# Patient Record
Sex: MALE | Race: BLACK OR AFRICAN AMERICAN | Employment: OTHER | URBAN - METROPOLITAN AREA
[De-identification: names, ages, dates, MRNs, and addresses within clinical notes are randomized per-mention and may not be internally consistent; named-entity substitution may affect disease eponyms.]

---

## 2018-04-23 ENCOUNTER — HOSPITAL ENCOUNTER (EMERGENCY)
Age: 23
Discharge: HOME | End: 2018-04-23
Attending: EMERGENCY MEDICINE
Payer: SELF-PAY

## 2018-04-23 ENCOUNTER — APPOINTMENT (OUTPATIENT)
Dept: GENERAL RADIOLOGY | Age: 23
End: 2018-04-23
Attending: PHYSICIAN ASSISTANT
Payer: SELF-PAY

## 2018-04-23 VITALS
HEIGHT: 66 IN | RESPIRATION RATE: 16 BRPM | WEIGHT: 128.31 LBS | SYSTOLIC BLOOD PRESSURE: 122 MMHG | TEMPERATURE: 97.9 F | HEART RATE: 91 BPM | BODY MASS INDEX: 20.62 KG/M2 | DIASTOLIC BLOOD PRESSURE: 89 MMHG | OXYGEN SATURATION: 100 %

## 2018-04-23 DIAGNOSIS — S61.411A LACERATION OF RIGHT HAND WITHOUT FOREIGN BODY, INITIAL ENCOUNTER: Primary | ICD-10-CM

## 2018-04-23 DIAGNOSIS — Z23 NEED FOR TETANUS BOOSTER: ICD-10-CM

## 2018-04-23 DIAGNOSIS — Z71.6 TOBACCO ABUSE COUNSELING: ICD-10-CM

## 2018-04-23 PROCEDURE — 74011250636 HC RX REV CODE- 250/636: Performed by: PHYSICIAN ASSISTANT

## 2018-04-23 PROCEDURE — 90715 TDAP VACCINE 7 YRS/> IM: CPT | Performed by: PHYSICIAN ASSISTANT

## 2018-04-23 PROCEDURE — 99283 EMERGENCY DEPT VISIT LOW MDM: CPT

## 2018-04-23 PROCEDURE — 73130 X-RAY EXAM OF HAND: CPT

## 2018-04-23 PROCEDURE — 90471 IMMUNIZATION ADMIN: CPT

## 2018-04-23 RX ADMIN — TETANUS TOXOID, REDUCED DIPHTHERIA TOXOID AND ACELLULAR PERTUSSIS VACCINE, ADSORBED 0.5 ML: 5; 2.5; 8; 8; 2.5 SUSPENSION INTRAMUSCULAR at 11:52

## 2018-04-23 NOTE — ED PROVIDER NOTES
EMERGENCY DEPARTMENT HISTORY AND PHYSICAL EXAM      Date: 4/23/2018  Patient Name: Holland Dyer    History of Presenting Illness     Chief Complaint   Patient presents with    Laceration     right hand 3rd, 4th and 5th fingers, x 2 days ago cut them moving stuff in kam.  g       History Provided By: Patient    HPI: Holland Dyer, 25 y.o. male with PMHx significant for tobacco abuse, presents ambulatory to the ED with cc of hand laceration that he obtained 3 days ago. Pt states he was moving furniture, when his hand got caught between furniture and a glass door which broke. He did not come immediately to be seen because he thought the cut would get better. He is not UTD with his tetanus vaccine. He is right-handed. Pt reports mild tenderness to the area that he cut. He states that he has been cleaning the area twice daily and applying antibiotic ointment. He denies purulent drainage, erythema, warmth, fevers. PCP: Deion Watts MD    There are no other complaints, changes, or physical findings at this time. Past History     Past Medical History:  History reviewed. No pertinent past medical history. Past Surgical History:  History reviewed. No pertinent surgical history. Family History:  History reviewed. No pertinent family history. Social History:  Social History   Substance Use Topics    Smoking status: Current Every Day Smoker    Smokeless tobacco: None    Alcohol use Yes      Comment: sometimes       Allergies:  No Known Allergies      Review of Systems   Review of Systems   Constitutional: Negative. Negative for activity change, appetite change, chills, diaphoresis, fever and unexpected weight change. HENT: Negative for congestion, hearing loss, rhinorrhea, sinus pressure, sneezing, sore throat and trouble swallowing. Eyes: Negative for pain, redness, itching and visual disturbance. Respiratory: Negative for cough, shortness of breath and wheezing.     Cardiovascular: Negative for chest pain, palpitations and leg swelling. Gastrointestinal: Negative for abdominal pain, constipation, diarrhea, nausea and vomiting. Genitourinary: Negative for dysuria. Musculoskeletal: Negative for arthralgias, gait problem and myalgias. Skin: Positive for wound. Negative for color change, pallor and rash. Neurological: Negative for tremors, weakness, light-headedness, numbness and headaches. All other systems reviewed and are negative. Physical Exam   Physical Exam   Constitutional: He is oriented to person, place, and time. He appears well-developed and well-nourished. No distress. 25 y.o.  male in NAD  Communicates appropriately and in full sentences   HENT:   Head: Normocephalic and atraumatic. Eyes: Conjunctivae are normal. Pupils are equal, round, and reactive to light. Right eye exhibits no discharge. Left eye exhibits no discharge. Neck: Normal range of motion. Neck supple. No nuchal rigidity or meningeal signs   Cardiovascular:   Pulses:       Radial pulses are 2+ on the right side, and 2+ on the left side. Pulmonary/Chest: Effort normal. No respiratory distress. Musculoskeletal: Normal range of motion. He exhibits tenderness (over R 5th finger). He exhibits no edema or deformity. No neurologic, motor, vascular, or compartment embarrassment observed on exam. No focal neurologic deficits. Neurological: He is alert and oriented to person, place, and time. Skin: Skin is warm and dry. No rash noted. He is not diaphoretic. No erythema. No pallor. 1 cm x 0.5 cm abrasion at the base of the 5th finger with no active bleeding. Appropriate healing visualized with good granulation tissue. NO purulent drainage, erythema, streaking. Area is minimally tender to palpation. Small scattered superficial abrasions to 2, 3, 4th fingers with no active bleeding, that are non-TTP   Psychiatric: He has a normal mood and affect.  His behavior is normal.   Nursing note and vitals reviewed. Diagnostic Study Results     Labs -   No results found for this or any previous visit (from the past 12 hour(s)). Radiologic Studies -   XR HAND RT MIN 3 V   Final Result        CT Results  (Last 48 hours)    None        CXR Results  (Last 48 hours)    None            Medical Decision Making   I am the first provider for this patient. I reviewed the vital signs, available nursing notes, past medical history, past surgical history, family history and social history. Vital Signs-Reviewed the patient's vital signs. Patient Vitals for the past 12 hrs:   Temp Pulse Resp BP SpO2   04/23/18 1107 97.9 °F (36.6 °C) 91 16 122/89 100 %     Records Reviewed: Nursing Notes and Old Medical Records    Provider Notes (Medical Decision Making):   DDX: wound care, wound repair, tetanus update, retained FB    XR without acute fracture or FB. Discussed wound care with patient. No clinical indication for ABX at this point in time. Wound care completed by nursing staff and wrapped with sterile dressing. Urged pt to return should he experienced fevers, color change, purulent drainage, streaking. Pt expresses understanding. Provided customary ED return precautions. ED Course:   Initial assessment performed. The patients presenting problems have been discussed, and they are in agreement with the care plan formulated and outlined with them. I have encouraged them to ask questions as they arise throughout their visit. TOBACCO COUNSELING:  Upon evaluation, pt expressed that they are a current tobacco user. Pt has been counseled on the dangers of smoking and was encouraged to quit as soon as possible in order to decrease further risks to their health. Pt has conveyed their understanding of the risks involved should they continue to use tobacco products. DISCHARGE NOTE:  Kati Олег  results have been reviewed with him. He has been counseled regarding his diagnosis.   He verbally conveys understanding and agreement of the signs, symptoms, diagnosis, treatment and prognosis and additionally agrees to follow up as recommended with Dr. Trisha Johnson MD in 24 - 48 hours. He also agrees with the care-plan and conveys that all of his questions have been answered. I have also put together some discharge instructions for him that include: 1) educational information regarding their diagnosis, 2) how to care for their diagnosis at home, as well a 3) list of reasons why they would want to return to the ED prior to their follow-up appointment, should their condition change. He and/or family's questions have been answered. I have encouraged them to see the official results in [facility]\" or to retrieve the specifics of their results from medical records. PLAN:  1. Return precautions as discussed  2. Follow-up with providers as directed  3. Medications as prescribed    Return to ED if worse     Diagnosis     Clinical Impression:   1. Laceration of right hand without foreign body, initial encounter    2. Need for tetanus booster    3. Tobacco abuse counseling        There are no discharge medications for this patient. Follow-up Information     Follow up With Details Comments Contact Info    Trisha Johnson MD Schedule an appointment as soon as possible for a visit in 2 days As needed, If symptoms worsen, Possible further evaluation and treatment [address] [facility] [address]      [facility] EMERGENCY DEPT Go to As needed, If symptoms worsen [address] [facility]    Pawel Arroyo MD Call today  [address] [facility]  [address]  [facility].  [phone]                This note will not be viewable in [address]

## 2018-04-23 NOTE — LETTER
[facility] EMERGENCY DEPT 
[address] [facility] [address] [id number] [phone] Work/School Note Date: 4/23/2018 To Whom It May concern: 
 
Asim Wray was seen and treated today in the emergency room by the following provider(s): 
Attending Provider: Martinez Vaughn MD 
Physician Assistant: Jammie Senior. Alisa Spencer. Asim Wray may return to work on 4/25/2018. Sincerely, 
 
 
 
 
Jammie Senior.  Alisa Spencer

## 2018-04-23 NOTE — DISCHARGE INSTRUCTIONS
Cuts Left Open: Care Instructions  Your Care Instructions    A cut can happen anywhere on your body. Sometimes a cut can injure the tendons, blood vessels, or nerves. A cut may be left open instead of being closed with stitches, staples, or adhesive. A cut may be left open when it is likely to become infected, because closing it can make infection even more likely. You will probably have a bandage. The doctor may want the cut to stay open the whole time it heals. This happens with some cuts when too much time has gone by since the cut happened. Or the doctor may tell you to come back to have the cut closed in 4 to 5 days, when there is less chance of infection. If the cut stays open while healing, your scar may be larger than if the cut was closed. But you can get treatment later to make the scar smaller. The doctor has checked you carefully, but problems can develop later. If you notice any problems or new symptoms, get medical treatment right away. Follow-up care is a key part of your treatment and safety. Be sure to make and go to all appointments, and call your doctor if you are having problems. It's also a good idea to know your test results and keep a list of the medicines you take. How can you care for yourself at home? · Keep the cut dry for the first 24 to 48 hours. After this, you can shower if your doctor okays it. Pat the cut dry. · Don't soak the cut, such as in a bathtub. Your doctor will tell you when it's safe to get the cut wet. · If your doctor told you how to care for your cut, follow your doctor's instructions. If you did not get instructions, follow this general advice:  ¨ After the first 24 to 48 hours, wash the cut with clean water 2 times a day. Don't use hydrogen peroxide or alcohol, which can slow healing. ¨ You may cover the cut with a thin layer of petroleum jelly, such as Vaseline, and a nonstick bandage.   ¨ Apply more petroleum jelly and replace the bandage as needed. · Prop up the injured area on a pillow anytime you sit or lie down during the next 3 days. Try to keep it above the level of your heart. This will help reduce swelling. · Avoid any activity that could cause your cut to get worse. · Take pain medicines exactly as directed. ¨ If the doctor gave you a prescription medicine for pain, take it as prescribed. ¨ If you are not taking a prescription pain medicine, ask your doctor if you can take an over-the-counter medicine. When should you call for help? Call your doctor now or seek immediate medical care if:  ? · You have new pain, or your pain gets worse. ? · The cut starts to bleed, and blood soaks through the bandage. Oozing small amounts of blood is normal.   ? · The skin near the cut is cold or pale or changes color. ? · You have tingling, weakness, or numbness near the cut.   ? · You have trouble moving the area near the cut.   ? · You have symptoms of infection, such as:  ¨ Increased pain, swelling, warmth, or redness around the cut. ¨ Red streaks leading from the cut. ¨ Pus draining from the cut. ¨ A fever. ? Watch closely for changes in your health, and be sure to contact your doctor if:  ? · The cut is not closing (getting smaller). ? · You do not get better as expected. Where can you learn more? Go to http://deanna-whit.info/. Enter 20-23-41-52 in the search box to learn more about \"Cuts Left Open: Care Instructions. \"  Current as of: March 20, 2017  Content Version: 11.4  © 8115-0699 [employer]. Care instructions adapted under license by [employer] (which disclaims liability or warranty for this information). If you have questions about a medical condition or this instruction, always ask your healthcare professional. [place] [address] any warranty or liability for your use of this information.        Hand Pain: Care Instructions  Your Care Instructions    Common causes of hand pain are overuse and injuries, such as might happen during sports or home repair projects. Everyday wear and tear, especially as you get older, also can cause hand pain. Most minor hand injuries will heal on their own, and home treatment is usually all you need to do. If you have sudden and severe pain, you may need tests and treatment. Follow-up care is a key part of your treatment and safety. Be sure to make and go to all appointments, and call your doctor if you are having problems. It's also a good idea to know your test results and keep a list of the medicines you take. How can you care for yourself at home? · Take pain medicines exactly as directed. ¨ If the doctor gave you a prescription medicine for pain, take it as prescribed. ¨ If you are not taking a prescription pain medicine, ask your doctor if you can take an over-the-counter medicine. · Rest and protect your hand. Take a break from any activity that may cause pain. · Put ice or a cold pack on your hand for 10 to 20 minutes at a time. Put a thin cloth between the ice and your skin. · Prop up the sore hand on a pillow when you ice it or anytime you sit or lie down during the next 3 days. Try to keep it above the level of your heart. This will help reduce swelling. · If your doctor recommends a sling, splint, or elastic bandage to support your hand, wear it as directed. When should you call for help? Call 911 anytime you think you may need emergency care. For example, call if:  ? · Your hand turns cool or pale or changes color. ?Call your doctor now or seek immediate medical care if:  ? · You cannot move your hand. ? · Your hand pops, moves out of its normal position, and then returns to its normal position. ? · You have signs of infection, such as:  ¨ Increased pain, swelling, warmth, or redness. ¨ Red streaks leading from the sore area. ¨ Pus draining from a place on your hand. ¨ A fever. ? · Your hand feels numb or tingly. ?Watch closely for changes in your health, and be sure to contact your doctor if:  ? · Your hand feels unstable when you try to use it. ? · You do not get better as expected. ? · You have any new symptoms, such as swelling. ? · Bruises from an injury to your hand last longer than 2 weeks. Where can you learn more? Go to http://deanna-whit.info/. Enter R273 in the search box to learn more about \"Hand Pain: Care Instructions. \"  Current as of: March 20, 2017  Content Version: 11.4  © 9403-5758 [employer]. Care instructions adapted under license by [employer] (which disclaims liability or warranty for this information). If you have questions about a medical condition or this instruction, always ask your healthcare professional. [address] any warranty or liability for your use of this information. Bones of the Hand: Anatomy Sketch    Current as of: March 21, 2017  Content Version: 11.4  © 1876-3341 [employer]. Care instructions adapted under license by [employer] (which disclaims liability or warranty for this information). If you have questions about a medical condition or this instruction, always ask your healthcare professional. [address] any warranty or liability for your use of this information.

## 2019-10-01 ENCOUNTER — HOSPITAL ENCOUNTER (EMERGENCY)
Age: 24
Discharge: HOME | End: 2019-10-01
Attending: EMERGENCY MEDICINE
Payer: MEDICAID

## 2019-10-01 VITALS
SYSTOLIC BLOOD PRESSURE: 127 MMHG | HEIGHT: 65 IN | RESPIRATION RATE: 14 BRPM | WEIGHT: 130 LBS | DIASTOLIC BLOOD PRESSURE: 90 MMHG | OXYGEN SATURATION: 100 % | HEART RATE: 46 BPM | BODY MASS INDEX: 21.66 KG/M2 | TEMPERATURE: 97.6 F

## 2019-10-01 DIAGNOSIS — Z20.2 EXPOSURE TO GONORRHEA: ICD-10-CM

## 2019-10-01 DIAGNOSIS — G89.29 ACUTE EXACERBATION OF CHRONIC LOW BACK PAIN: ICD-10-CM

## 2019-10-01 DIAGNOSIS — Z20.2 EXPOSURE TO SEXUALLY TRANSMITTED DISEASE (STD): Primary | ICD-10-CM

## 2019-10-01 DIAGNOSIS — Z72.0 TOBACCO ABUSE: ICD-10-CM

## 2019-10-01 DIAGNOSIS — Z76.89 ENCOUNTER FOR ASSESSMENT OF STD EXPOSURE: ICD-10-CM

## 2019-10-01 DIAGNOSIS — M54.50 ACUTE EXACERBATION OF CHRONIC LOW BACK PAIN: ICD-10-CM

## 2019-10-01 LAB
APPEARANCE UR: CLEAR
BACTERIA URNS QL MICRO: NEGATIVE /HPF
BILIRUB UR QL: NEGATIVE
COLOR UR: ABNORMAL
EPITH CASTS URNS QL MICRO: ABNORMAL /LPF
GLUCOSE UR STRIP.AUTO-MCNC: NEGATIVE MG/DL
HGB UR QL STRIP: NEGATIVE
KETONES UR QL STRIP.AUTO: NEGATIVE MG/DL
LEUKOCYTE ESTERASE UR QL STRIP.AUTO: ABNORMAL
NITRITE UR QL STRIP.AUTO: NEGATIVE
PH UR STRIP: 7 [PH] (ref 5–8)
PROT UR STRIP-MCNC: NEGATIVE MG/DL
RBC #/AREA URNS HPF: ABNORMAL /HPF (ref 0–5)
SP GR UR REFRACTOMETRY: 1.02 (ref 1–1.03)
UA: UC IF INDICATED,UAUC: ABNORMAL
UROBILINOGEN UR QL STRIP.AUTO: 1 EU/DL (ref 0.2–1)
WBC URNS QL MICRO: ABNORMAL /HPF (ref 0–4)

## 2019-10-01 PROCEDURE — 81001 URINALYSIS AUTO W/SCOPE: CPT

## 2019-10-01 PROCEDURE — 96372 THER/PROPH/DIAG INJ SC/IM: CPT

## 2019-10-01 PROCEDURE — 74011250636 HC RX REV CODE- 250/636: Performed by: EMERGENCY MEDICINE

## 2019-10-01 PROCEDURE — 74011000250 HC RX REV CODE- 250: Performed by: EMERGENCY MEDICINE

## 2019-10-01 PROCEDURE — 87086 URINE CULTURE/COLONY COUNT: CPT

## 2019-10-01 PROCEDURE — 74011250637 HC RX REV CODE- 250/637: Performed by: EMERGENCY MEDICINE

## 2019-10-01 PROCEDURE — 99283 EMERGENCY DEPT VISIT LOW MDM: CPT

## 2019-10-01 PROCEDURE — 87491 CHLMYD TRACH DNA AMP PROBE: CPT

## 2019-10-01 RX ORDER — AZITHROMYCIN 500 MG/1
1000 TABLET, FILM COATED ORAL
Status: COMPLETED | OUTPATIENT
Start: 2019-10-01 | End: 2019-10-01

## 2019-10-01 RX ADMIN — AZITHROMYCIN MONOHYDRATE 1000 MG: 500 TABLET ORAL at 05:24

## 2019-10-01 RX ADMIN — LIDOCAINE HYDROCHLORIDE 250 MG: 10 INJECTION, SOLUTION EPIDURAL; INFILTRATION; INTRACAUDAL; PERINEURAL at 05:24

## 2019-10-01 NOTE — ED TRIAGE NOTES
Pt presents to the ED with c/o lower back pain x 1 month. Pt reports taking ibuprofen and hot baths without relief. Pt reports playing football but denies injury. Pt also has concerns for STD's. Pt stated he has dysuria but denies discharge or frequency. Denies lesions or bumps. Pt is alert, oriented and appropriate. Ambulatory on arrival.       Emergency Department Nursing Plan of Care       The Nursing Plan of Care is developed from the Nursing assessment and Emergency Department Attending provider initial evaluation. The plan of care may be reviewed in the ED Provider note.     The Plan of Care was developed with the following considerations:   Patient / Family readiness to learn indicated by:verbalized understanding  Persons(s) to be included in education: patient  Barriers to Learning/Limitations:No    Signed     Que Villasenor    10/1/2019   5:07 AM

## 2019-10-01 NOTE — LETTER
10/2/2019 Yasmin Torrez [address] [id number] Dear Mr. Pasha Teague You were seen in the Emergency Department of [address] [place] [address] on 10/1/2019 and had lab and/or radiology tests performed. The gonorrhea, chlamydia from your Emergency Department visit on 10/1/2019 was positive. You were treated appropriately during your visit. No further treatment is required. If you have not improved or are worsening, please follow up with your primary care doctor or Emergency department as soon as possible. Your partner needs to be treated. If you have any questions please contact the Emergency Department at [phone]. Sincerely, Marika Barriga PA-C 
 
[facility] EMERGENCY DEPT 
[address] [place] [id number] [phone]

## 2019-10-01 NOTE — ED PROVIDER NOTES
EMERGENCY DEPARTMENT HISTORY AND PHYSICAL EXAM      Please note that this dictation was completed with [employer], the computer voice recognition software. Quite often unanticipated grammatical, syntax, homophones, and other interpretive errors are inadvertently transcribed by the computer software. Please disregard these errors. Please excuse any errors that have escaped final proofreading. Date: 10/1/2019  Patient Name: Yvon Salamanca  Patient Age and Sex: 25 y.o. male    History of Presenting Illness     Chief Complaint   Patient presents with    Back Pain    Exposure to STD       History Provided By: Patient    HPI: Yvon Salamanca, 25 y.o. male with past medical history as documented below presents to the ED with c/o of one month history of atraumatic low back pain as well as concerns for STD exposure. Pt reports he plays football but denies any known injuries. He has tried Motrin and hot baths without relief. He denies any numbness, weakness or loss of bladder or bowel control. Additionally, he reports concerns for STD exposure given recent unprotected sex. He wants empiric treatment and testing. Pt denies any other alleviating or exacerbating factors. Additionally, pt specifically denies any recent fever, chills, headache, nausea, vomiting, abdominal pain, CP, SOB, lightheadedness, dizziness, numbness, weakness, BLE swelling, heart palpitations, urinary sxs, diarrhea, constipation, melena, hematochezia, cough, or congestion. There are no other complaints, changes or physical findings at this time. PCP: Haydee Shipley MD    Past History   Past Medical History:  History reviewed. No pertinent past medical history. Past Surgical History:  History reviewed. No pertinent surgical history. Family History:  History reviewed. No pertinent family history.     Social History:  Social History     Tobacco Use    Smoking status: Current Every Day Smoker   Substance Use Topics    Alcohol use: Yes     Comment: sometimes    Drug use: No       Allergies:  No Known Allergies    Current Medications:  No current facility-administered medications on file prior to encounter. No current outpatient medications on file prior to encounter. Review of Systems   Review of Systems   Constitutional: Negative. Negative for chills and fever. HENT: Negative. Negative for congestion, facial swelling, rhinorrhea, sore throat, trouble swallowing and voice change. Eyes: Negative. Respiratory: Negative. Negative for apnea, cough, chest tightness, shortness of breath and wheezing. Cardiovascular: Negative. Negative for chest pain, palpitations and leg swelling. Gastrointestinal: Negative. Negative for abdominal distention, abdominal pain, blood in stool, constipation, diarrhea, nausea and vomiting. Endocrine: Negative. Negative for cold intolerance, heat intolerance and polyuria. Genitourinary: Negative. Negative for difficulty urinating, dysuria, flank pain, frequency, hematuria and urgency. Musculoskeletal: Positive for back pain and myalgias. Negative for arthralgias, neck pain and neck stiffness. Skin: Negative. Negative for color change and rash. Neurological: Negative. Negative for dizziness, syncope, facial asymmetry, speech difficulty, weakness, light-headedness, numbness and headaches. Hematological: Negative. Does not bruise/bleed easily. Psychiatric/Behavioral: Negative. Negative for confusion and self-injury. The patient is not nervous/anxious. Physical Exam   Physical Exam   Constitutional: He is oriented to person, place, and time. Vital signs are normal. He appears well-developed and well-nourished. He is cooperative. Non-toxic appearance. HENT:   Head: Normocephalic and atraumatic. Mouth/Throat: Mucous membranes are normal. No posterior oropharyngeal erythema. Eyes: Pupils are equal, round, and reactive to light.  Conjunctivae and EOM are normal.   Neck: Normal range of motion. Cardiovascular: Normal rate, regular rhythm, normal heart sounds and intact distal pulses. Exam reveals no gallop and no friction rub. No murmur heard. Pulmonary/Chest: Effort normal and breath sounds normal. No respiratory distress. He has no wheezes. He has no rales. He exhibits no tenderness. Abdominal: Soft. Bowel sounds are normal. He exhibits no distension and no mass. There is no tenderness. There is no rebound and no guarding. Musculoskeletal: Normal range of motion. He exhibits tenderness (paralumbar TTP, no mildine TTP). He exhibits no edema or deformity. Neurological: He is alert and oriented to person, place, and time. He displays normal reflexes. No cranial nerve deficit. He exhibits normal muscle tone. Coordination normal.   Skin: Skin is warm. No rash noted. Psychiatric: He has a normal mood and affect. Nursing note and vitals reviewed. Diagnostic Study Results     Labs -  No results found for this or any previous visit (from the past 24 hour(s)). Radiologic Studies -   No orders to display     CT Results  (Last 48 hours)    None        CXR Results  (Last 48 hours)    None          Medical Decision Making   I am the first provider for this patient. I reviewed the vital signs, available nursing notes, past medical history, past surgical history, family history and social history. Vital Signs-Reviewed the patient's vital signs. Patient Vitals for the past 24 hrs:   Temp Pulse Resp BP SpO2   10/01/19 0504 97.6 °F (36.4 °C) (!) 46 14 127/90 100 %       Pulse Oximetry Analysis - 100% on RA    Cardiac Monitor:   Rate: 46 bpm  Rhythm: Normal Sinus Rhythm      Records Reviewed: Nursing Notes, Old Medical Records, Previous electrocardiograms, Previous Radiology Studies and Previous Laboratory Studies    Provider Notes (Medical Decision Making): The patient presents with acute low back pain. Stable vitals and benign exam. DDx: strain, sprain, sciatica, MSK pain. Clinical presentation not consistent with  pathology, aortic dissection or AAA. There is no urine/bowel incontinence or perianal numbess to suggest cauda equina. No fever/chills, IVDA to suggest epidural abscess or discitis. No focal weakness or sensory changes to suggest transverse myelitis. Therefore MRI not indicated. No risk of compression fracture (not in right age group or suffer from [address]) or trauma to warrant x-ray. I have recommended rest, avoiding heavy lifting until better, use of intermittent heat (avoid sleeping on a heating pad), and use of OTC NSAID's (Advil, Aleve etc) or Tylenol prn for pain. Call PCP if back pain persists or he develops leg symptoms or other concerning red flags. Will provide pain control and refer to PT. Also, pt presents with STD exposure  DDx: Acute cystitis, pyleonephritis, ureteral stone, STI. Will obtain UA and treat accordingly. If patient expresses concern for STI exposure, will empirically treat. Also, discussed importance of testing partners and safe sex education provided. Discussed with the patient diagnosis and test results and all questioned fully answered. They understand the importance of staying well hydrated, taking antibiotics as prescribed to completion. He will PCP if any problems arise. ED Course:   Initial assessment performed. The patients presenting problems have been discussed, and they are in agreement with the care plan formulated and outlined with them. I have encouraged them to ask questions as they arise throughout their visit. TOBACCO COUNSELING:   Upon evaluation, pt expressed that they are a current tobacco user. For approximately 10 minutes, pt has been counseled on the dangers of smoking and was encouraged to quit as soon as possible in order to decrease further risks to their health. Pt has conveyed their understanding of the risks involved should they continue to use tobacco products.     ALCOHOL/SUBSTANCE ABUSE COUNSELING:  Upon evaluation, pt endorsed recent alcohol/illicit drug use. For approximately 15 minutes, pt has been counseled on the dangers of alcohol and illicit drug use on their health, and they were encouraged to quit as soon as possible in order to decrease further risks to their health. Pt has conveyed their understanding of the risks involved should they continue to use these products. I reviewed our electronic medical record system for any past medical records that were available that may contribute to the patient's current condition, the nursing notes and vital signs from today's visit. Sherri Montague MD    ED Orders Placed :  Orders Placed This Encounter    CHLAMYDIA/GC AMPLIFIED PROBE [[id number]]    URINALYSIS W/ REFLEX CULTURE    ENCOURAGE PO FLUIDS    cefTRIAXone (ROCEPHIN) 250 mg in lidocaine (PF) (XYLOCAINE) 10 mg/mL (1 %) IM injection    azithromycin (ZITHROMAX) tablet 1,000 mg     ED Medications Administered:  Medications   cefTRIAXone (ROCEPHIN) 250 mg in lidocaine (PF) (XYLOCAINE) 10 mg/mL (1 %) IM injection (has no administration in time range)   azithromycin (ZITHROMAX) tablet 1,000 mg (has no administration in time range)        Progress Note:  Patient has been reassessed and reports feeling better and symptoms have improved significantly after ED treatment. Patient feels comfortable going home with close follow-up. Cecilia Reyes final labs and imaging have been reviewed with him and available family and/or caregiver. They have been counseled regarding his diagnosis. He verbally conveys understanding and agreement of the signs, symptoms, diagnosis, treatment and prognosis and additionally agrees to follow up as recommended with Dr. Hina Domingo MD and/or specialist in 24 - 48 hours. He also agrees with the care-plan we created together and conveys that all of his questions have been answered.   I have also put together some discharge instructions for him that include: 1) educational information regarding their diagnosis, 2) how to care for their diagnosis at home, as well a 3) list of reasons why they would want to return to the ED prior to their follow-up appointment should the patient's condition change or symptoms worsen. I have answered all questions to the patient's satisfaction. Strict return precautions given. He both understood and agreed with plan as discussed. Vital signs stable for discharge. Disposition: DISCHARGE  The pt is ready for discharge. The pt's signs, symptoms, diagnosis, and discharge instructions have been discussed and pt has conveyed their understanding. The pt is to follow up as recommended or return to ER should their symptoms worsen. Plan has been discussed and pt is in agreement. PLAN:  1. No current facility-administered medications for this encounter. No current outpatient medications on file. 2.   Follow-up Information     Follow up With Specialties Details Why Contact Info    Sophia Correia MD Internal Medicine   [address]  [facility] [address]  [phone]      [facility] EMERGENCY DEPT Emergency Medicine  As needed, If symptoms worsen [address] [facility] [place]  [phone]          Return to ED if worse  Diagnosis     Clinical Impression:   1. Exposure to sexually transmitted disease (STD)    2. Encounter for assessment of STD exposure    3. Tobacco abuse    4. Exposure to gonorrhea    5. Acute exacerbation of chronic low back pain        Attestation:  I personally performed the services described in this documentation on this date 10/1/2019 for patientRobe. Pawel Ibarra MD      This note will not be viewable in [address]

## 2019-10-01 NOTE — DISCHARGE INSTRUCTIONS
[facility] SYSTEMS Departments   For adult and child immunizations, family planning, TB screening, STD testing and women's health services. [facility]: [place] [phone]     [place] [address] [facility] 1822   [facility]   729 [facility]: Brina Guerrero [address] [phone]     [address]        [place] [place] [address]     For primary care services, woman and child wellness, and some clinics providing specialty care. [facility] -- [address] 2525 [facility] [phone]   411 [facility] 200 [facility] 3614 [facility] [phone]   [address] [facility] [address] [place] [address] [phone]   [address] [facility] 1604 [facility] 5850  Community  [phone]   7704 [facility] [address] [address] [place] [phone]   [facility] 81 [facility] [phone]   [place] [address] [phone]   [facility]: [facility] 4100 [facility] [address] [place] [address], [address]     [place] [address] 3906 [facility]  2930  Community  [phone]   [employer]  [address] ([email or web address]         Sexual Health/Woman Wellness Clinics   For STD/HIV testing and treatment, pregnancy testing and services, men's health, birth control services, LGBT services, and hepatitis/HPV vaccine services. [employer] [address] [facility] [address] [facility] [address] [facility] 1579 [address] [phone]   [facility] [address], 5th floor [phone]   Pregnancy [address] [facility] [address] [phone]                  Thank you for allowing us to take care of you today!     We hope we addressed all of your concerns and needs. We strive to provide excellent quality care in the Emergency Department. You will receive a survey after your visit to evaluate the care you were provided. Should you receive a survey from us, we invite you to share your experience and tell us what made it excellent. It was a pleasure serving you, we invite you to share your experience with us, in our pursuit for excellence, should you be selected to receive a survey. The exam and treatment you received in the Emergency Department were for an urgent problem and are not intended as complete care. It is important that you follow up with a doctor, nurse practitioner, or physician assistant for ongoing care. If your symptoms become worse or you do not improve as expected and you are unable to reach your usual health care provider, you should return to the Emergency Department. We are available 24 hours a day. Please take your discharge instructions with you when you go to your follow-up appointment. If you have any problem arranging a follow-up appointment, contact the Emergency Department immediately. If a prescription has been provided, please have it filled as soon as possible to prevent a delay in treatment. Read the entire medication instruction sheet provided to you by the pharmacy. If you have any questions or reservations about taking the medication due to side effects or interactions with other medications, please call your primary care physician or contact the ER to speak with the charge nurse. Make an appointment with your family doctor or the physician you were referred to for follow-up of this visit as instructed on your discharge paperwork, as this is mandatory follow-up. Return to the ER if you are unable to be seen or if you are unable to be seen in a timely manner. If you have any problem arranging the follow-up visit, contact the Emergency Department immediately.      I hope you feel better and thank you again for allow us to provide you with excellent care today at [facility]!       Warmest regards,    Emelyn Rios MD  Emergency Medicine Physician  [facility]

## 2019-10-02 LAB
BACTERIA SPEC CULT: NORMAL
C TRACH DNA SPEC QL NAA+PROBE: POSITIVE
CC UR VC: NORMAL
N GONORRHOEA DNA SPEC QL NAA+PROBE: POSITIVE
SAMPLE TYPE: ABNORMAL
SERVICE CMNT-IMP: ABNORMAL
SERVICE CMNT-IMP: NORMAL
SPECIMEN SOURCE: ABNORMAL

## 2019-10-04 ENCOUNTER — HOSPITAL ENCOUNTER (EMERGENCY)
Age: 24
Discharge: STILL ADMITTED OR ADMINISTRATIVE | End: 2019-10-04
Attending: EMERGENCY MEDICINE
Payer: SELF-PAY

## 2019-10-04 PROCEDURE — 75810000275 HC EMERGENCY DEPT VISIT NO LEVEL OF CARE

## 2020-01-09 ENCOUNTER — HOSPITAL ENCOUNTER (EMERGENCY)
Age: 25
Discharge: HOME | End: 2020-01-09
Attending: EMERGENCY MEDICINE
Payer: COMMERCIAL

## 2020-01-09 VITALS
HEIGHT: 65 IN | DIASTOLIC BLOOD PRESSURE: 70 MMHG | BODY MASS INDEX: 21.66 KG/M2 | RESPIRATION RATE: 18 BRPM | OXYGEN SATURATION: 98 % | HEART RATE: 91 BPM | SYSTOLIC BLOOD PRESSURE: 131 MMHG | WEIGHT: 130 LBS | TEMPERATURE: 98.3 F

## 2020-01-09 DIAGNOSIS — N34.2 URETHRITIS: ICD-10-CM

## 2020-01-09 DIAGNOSIS — M62.830 SPASM OF MUSCLE OF LOWER BACK: Primary | ICD-10-CM

## 2020-01-09 LAB
APPEARANCE UR: ABNORMAL
BACTERIA URNS QL MICRO: NEGATIVE /HPF
BILIRUB UR QL: NEGATIVE
COLOR UR: ABNORMAL
EPITH CASTS URNS QL MICRO: ABNORMAL /LPF
GLUCOSE UR STRIP.AUTO-MCNC: NEGATIVE MG/DL
HGB UR QL STRIP: NEGATIVE
KETONES UR QL STRIP.AUTO: NEGATIVE MG/DL
LEUKOCYTE ESTERASE UR QL STRIP.AUTO: ABNORMAL
NITRITE UR QL STRIP.AUTO: NEGATIVE
PH UR STRIP: 7 [PH] (ref 5–8)
PROT UR STRIP-MCNC: ABNORMAL MG/DL
RBC #/AREA URNS HPF: ABNORMAL /HPF (ref 0–5)
SP GR UR REFRACTOMETRY: >1.03 (ref 1–1.03)
UA: UC IF INDICATED,UAUC: ABNORMAL
UROBILINOGEN UR QL STRIP.AUTO: 1 EU/DL (ref 0.2–1)
WBC URNS QL MICRO: ABNORMAL /HPF (ref 0–4)

## 2020-01-09 PROCEDURE — 96372 THER/PROPH/DIAG INJ SC/IM: CPT

## 2020-01-09 PROCEDURE — 99283 EMERGENCY DEPT VISIT LOW MDM: CPT

## 2020-01-09 PROCEDURE — 87086 URINE CULTURE/COLONY COUNT: CPT

## 2020-01-09 PROCEDURE — 81001 URINALYSIS AUTO W/SCOPE: CPT

## 2020-01-09 PROCEDURE — 74011250637 HC RX REV CODE- 250/637: Performed by: EMERGENCY MEDICINE

## 2020-01-09 PROCEDURE — 74011000250 HC RX REV CODE- 250: Performed by: EMERGENCY MEDICINE

## 2020-01-09 PROCEDURE — 87491 CHLMYD TRACH DNA AMP PROBE: CPT

## 2020-01-09 PROCEDURE — 74011250636 HC RX REV CODE- 250/636: Performed by: EMERGENCY MEDICINE

## 2020-01-09 RX ORDER — AZITHROMYCIN 500 MG/1
1000 TABLET, FILM COATED ORAL
Status: COMPLETED | OUTPATIENT
Start: 2020-01-09 | End: 2020-01-09

## 2020-01-09 RX ORDER — CYCLOBENZAPRINE HCL 10 MG
10 TABLET ORAL
Status: COMPLETED | OUTPATIENT
Start: 2020-01-09 | End: 2020-01-09

## 2020-01-09 RX ORDER — CYCLOBENZAPRINE HCL 10 MG
10 TABLET ORAL
Qty: 12 TAB | Refills: 0 | Status: SHIPPED | OUTPATIENT
Start: 2020-01-09 | End: 2020-07-05

## 2020-01-09 RX ORDER — AZITHROMYCIN 500 MG/1
1000 TABLET, FILM COATED ORAL
Status: DISCONTINUED | OUTPATIENT
Start: 2020-01-09 | End: 2020-01-09 | Stop reason: SDUPTHER

## 2020-01-09 RX ORDER — IBUPROFEN 800 MG/1
800 TABLET ORAL
Qty: 20 TAB | Refills: 0 | Status: SHIPPED | OUTPATIENT
Start: 2020-01-09 | End: 2020-01-16

## 2020-01-09 RX ORDER — DOXYCYCLINE HYCLATE 100 MG
100 TABLET ORAL 2 TIMES DAILY
Qty: 14 TAB | Refills: 0 | Status: SHIPPED | OUTPATIENT
Start: 2020-01-09 | End: 2020-01-16

## 2020-01-09 RX ORDER — IBUPROFEN 400 MG/1
800 TABLET ORAL
Status: COMPLETED | OUTPATIENT
Start: 2020-01-09 | End: 2020-01-09

## 2020-01-09 RX ADMIN — LIDOCAINE HYDROCHLORIDE 250 MG: 10 INJECTION, SOLUTION EPIDURAL; INFILTRATION; INTRACAUDAL; PERINEURAL at 02:22

## 2020-01-09 RX ADMIN — AZITHROMYCIN MONOHYDRATE 1000 MG: 500 TABLET ORAL at 02:23

## 2020-01-09 RX ADMIN — CYCLOBENZAPRINE HYDROCHLORIDE 10 MG: 10 TABLET, FILM COATED ORAL at 02:22

## 2020-01-09 RX ADMIN — IBUPROFEN 800 MG: 400 TABLET ORAL at 02:22

## 2020-01-09 NOTE — LETTER
1/10/2020 Ina Harman [address] [id number] Dear Mr. David Barrera You were seen in the Emergency Department of [address] [place] [address] on 1/9/2020 and had lab and/or radiology tests performed. We would like to discuss these results with you . Please call the Emergency Department at your earliest convenience at [phone], to speak with one of our providers. The gonorrhea from your Emergency Department visit was positive. You were treated appropriately during your visit. No further treatment is required. Your partner needs to be treated. If you have any questions please contact the Emergency Department at [phone]. Sincerely, Brant Felix NP 
 
[facility] EMERGENCY DEPT 
[address]  [address] [phone]

## 2020-01-09 NOTE — ED PROVIDER NOTES
69-year-old male presents with low back pain for the last month. Tonight it prevented him from sleep so he came in for evaluation. The pain is worse when he lays in the wrong position and when he tries to move. It is bilateral.  He denies any known injury or trauma but he does play sports and go to the gym. Denies fever, hematuria, penile discharge, genital sores. He does however report dysuria and he has had a possible exposure to an STD. Back Pain    Pertinent negatives include no chest pain, no fever, no abdominal pain and no dysuria. Urinary Pain    Associated symptoms include back pain. Pertinent negatives include no nausea, no vomiting and no abdominal pain. No past medical history on file. No past surgical history on file. No family history on file.     Social History     Socioeconomic History    Marital status: SINGLE     Spouse name: Not on file    Number of children: Not on file    Years of education: Not on file    Highest education level: Not on file   Occupational History    Not on file   Social Needs    Financial resource strain: Not on file    Food insecurity:     Worry: Not on file     Inability: Not on file    Transportation needs:     Medical: Not on file     Non-medical: Not on file   Tobacco Use    Smoking status: Current Every Day Smoker   Substance and Sexual Activity    Alcohol use: Yes     Comment: sometimes    Drug use: No    Sexual activity: Not on file   Lifestyle    Physical activity:     Days per week: Not on file     Minutes per session: Not on file    Stress: Not on file   Relationships    Social connections:     Talks on phone: Not on file     Gets together: Not on file     Attends Sikh service: Not on file     Active member of club or organization: Not on file     Attends meetings of clubs or organizations: Not on file     Relationship status: Not on file    Intimate partner violence:     Fear of current or ex partner: Not on file Emotionally abused: Not on file     Physically abused: Not on file     Forced sexual activity: Not on file   Other Topics Concern    Not on file   Social History Narrative    Not on file         ALLERGIES: Patient has no known allergies. Review of Systems   Constitutional: Negative. Negative for fever. HENT: Negative. Negative for drooling, facial swelling and trouble swallowing. Eyes: Negative. Negative for discharge and redness. Respiratory: Negative. Negative for chest tightness, shortness of breath and wheezing. Cardiovascular: Negative. Negative for chest pain. Gastrointestinal: Negative. Negative for abdominal distention, abdominal pain, constipation, diarrhea, nausea and vomiting. Endocrine: Negative. Genitourinary: Negative. Negative for difficulty urinating and dysuria. Musculoskeletal: Positive for back pain. Negative for arthralgias and myalgias. Skin: Negative. Negative for color change and rash. Allergic/Immunologic: Negative. Neurological: Negative. Negative for syncope, facial asymmetry and speech difficulty. Hematological: Negative. Psychiatric/Behavioral: Negative. Negative for agitation and confusion. All other systems reviewed and are negative. Vitals:    01/09/20 0133   BP: 131/70   Pulse: 91   Resp: 18   Temp: 98.3 °F (36.8 °C)   SpO2: 98%   Weight: 59 kg (130 lb)   Height: 5' 5\" (1.651 m)            Physical Exam  Vitals signs and nursing note reviewed. Constitutional:       Appearance: Normal appearance. He is well-developed. HENT:      Head: Normocephalic and atraumatic. Eyes:      Conjunctiva/sclera: Conjunctivae normal.   Neck:      Musculoskeletal: Neck supple. Cardiovascular:      Rate and Rhythm: Normal rate and regular rhythm. Pulmonary:      Effort: No accessory muscle usage or respiratory distress. Abdominal:      Palpations: Abdomen is soft. Tenderness: There is no tenderness.    Musculoskeletal: Normal range of motion. Comments: Bilateral lumbar paraspinal muscle tenderness and spasm. No localized midline vertebral tenderness. No vertebral deformity or step-off. Distally NVI. Negative straight leg raise. Skin:     General: Skin is warm and dry. Neurological:      Mental Status: He is alert and oriented to person, place, and time. Psychiatric:         Behavior: Behavior normal.         Thought Content: Thought content normal.          MDM  Number of Diagnoses or Management Options  Diagnosis management comments: Low back strain, sprain, spasm. No clinical suspicion for fracture given absence of vertebral tenderness and absence of injury. Will treat for likely urethritis. Procedures      LABORATORY TESTS:  Recent Results (from the past 12 hour(s))   URINALYSIS W/ REFLEX CULTURE    Collection Time: 01/09/20  1:42 AM   Result Value Ref Range    Color YELLOW/STRAW      Appearance CLOUDY (A) CLEAR      Specific gravity >1.030 (H) 1.003 - 1.030    pH (UA) 7.0 5.0 - 8.0      Protein TRACE (A) NEG mg/dL    Glucose NEGATIVE  NEG mg/dL    Ketone NEGATIVE  NEG mg/dL    Bilirubin NEGATIVE  NEG      Blood NEGATIVE  NEG      Urobilinogen 1.0 0.2 - 1.0 EU/dL    Nitrites NEGATIVE  NEG      Leukocyte Esterase LARGE (A) NEG      WBC 20-50 0 - 4 /hpf    RBC 0-5 0 - 5 /hpf    Epithelial cells FEW FEW /lpf    Bacteria NEGATIVE  NEG /hpf    UA:UC IF INDICATED URINE CULTURE ORDERED (A) CNI         IMAGING RESULTS:  No orders to display       MEDICATIONS GIVEN:  Medications   ibuprofen (MOTRIN) tablet 800 mg (800 mg Oral Given 1/9/20 0222)   cyclobenzaprine (FLEXERIL) tablet 10 mg (10 mg Oral Given 1/9/20 0222)   cefTRIAXone (ROCEPHIN) 250 mg in lidocaine (PF) (XYLOCAINE) 10 mg/mL (1 %) IM injection (250 mg IntraMUSCular Given 1/9/20 0222)   azithromycin (ZITHROMAX) tablet 1,000 mg (1,000 mg Oral Given 1/9/20 0223)       IMPRESSION:  1. Spasm of muscle of lower back    2. Urethritis        PLAN:  1.    Discharge Medication List as of 1/9/2020  2:17 AM      START taking these medications    Details   ibuprofen (MOTRIN) 800 mg tablet Take 1 Tab by mouth every six (6) hours as needed for Pain for up to 7 days. , Print, Disp-20 Tab, R-0      cyclobenzaprine (FLEXERIL) 10 mg tablet Take 1 Tab by mouth three (3) times daily as needed for Muscle Spasm(s). , Print, Disp-12 Tab, R-0      doxycycline (VIBRA-TABS) 100 mg tablet Take 1 Tab by mouth two (2) times a day for 7 days. , Print, Disp-14 Tab, R-0           2. Follow-up Information     Follow up With Specialties Details Why Contact Info    Manny Thorne MD Internal Medicine Schedule an appointment as soon as possible for a visit  [address] [address] [phone]      [facility] [place] EMERGENCY DEPT Emergency Medicine  As needed, If symptoms worsen [address] [facility]  [phone]        Return to ED if worse

## 2020-01-09 NOTE — ED NOTES
Pt presents to the ED with c/o lower back pain x a month and dysuria x 1 week. Pt stated \"I know I got something. She gave me something. At first I thought I was tripping but I know I got it. \" pt stated he has been taking ibuprofen without relief; last dose was 12 PM. Pt denies penile discharge. Pt stated his partner recently went to the doctor but he is unsure of what she went for. Pt is alert, oriented and appropriate. Ambulatory to bathroom independently. Emergency Department Nursing Plan of Care       The Nursing Plan of Care is developed from the Nursing assessment and Emergency Department Attending provider initial evaluation. The plan of care may be reviewed in the ED Provider note.     The Plan of Care was developed with the following considerations:   Patient / Family readiness to learn indicated by:verbalized understanding  Persons(s) to be included in education: patient  Barriers to Learning/Limitations:No    Signed     Lorre Hash    1/9/2020   1:50 AM

## 2020-01-09 NOTE — DISCHARGE INSTRUCTIONS
Patient Education        Back Pain: Care Instructions  Your Care Instructions    Back pain has many possible causes. It is often related to problems with muscles and ligaments of the back. It may also be related to problems with the nerves, discs, or bones of the back. Moving, lifting, standing, sitting, or sleeping in an awkward way can strain the back. Sometimes you don't notice the injury until later. Arthritis is another common cause of back pain. Although it may hurt a lot, back pain usually improves on its own within several weeks. Most people recover in 12 weeks or less. Using good home treatment and being careful not to stress your back can help you feel better sooner. Follow-up care is a key part of your treatment and safety. Be sure to make and go to all appointments, and call your doctor if you are having problems. It's also a good idea to know your test results and keep a list of the medicines you take. How can you care for yourself at home? · Sit or lie in positions that are most comfortable and reduce your pain. Try one of these positions when you lie down:  ? Lie on your back with your knees bent and supported by large pillows. ? Lie on the floor with your legs on the seat of a sofa or chair. ? Lie on your side with your knees and hips bent and a pillow between your legs. ? Lie on your stomach if it does not make pain worse. · Do not sit up in bed, and avoid soft couches and twisted positions. Bed rest can help relieve pain at first, but it delays healing. Avoid bed rest after the first day of back pain. · Change positions every 30 minutes. If you must sit for long periods of time, take breaks from sitting. Get up and walk around, or lie in a comfortable position. · Try using a heating pad on a low or medium setting for 15 to 20 minutes every 2 or 3 hours. Try a warm shower in place of one session with the heating pad. · You can also try an ice pack for 10 to 15 minutes every 2 to 3 hours. Put a thin cloth between the ice pack and your skin. · Take pain medicines exactly as directed. ? If the doctor gave you a prescription medicine for pain, take it as prescribed. ? If you are not taking a prescription pain medicine, ask your doctor if you can take an over-the-counter medicine. · Take short walks several times a day. You can start with 5 to 10 minutes, 3 or 4 times a day, and work up to longer walks. Walk on level surfaces and avoid hills and stairs until your back is better. · Return to work and other activities as soon as you can. Continued rest without activity is usually not good for your back. · To prevent future back pain, do exercises to stretch and strengthen your back and stomach. Learn how to use good posture, safe lifting techniques, and proper body mechanics. When should you call for help? Call your doctor now or seek immediate medical care if:    · You have new or worsening numbness in your legs.     · You have new or worsening weakness in your legs. (This could make it hard to stand up.)     · You lose control of your bladder or bowels.    Watch closely for changes in your health, and be sure to contact your doctor if:    · You have a fever, lose weight, or don't feel well.     · You do not get better as expected. Where can you learn more? Go to http://deanna-whit.info/. Enter Z103 in the search box to learn more about \"Back Pain: Care Instructions. \"  Current as of: June 26, 2019  Content Version: 12.2  © 2815-3195 [employer]. Care instructions adapted under license by [employer] (which disclaims liability or warranty for this information). If you have questions about a medical condition or this instruction, always ask your healthcare professional. [place] [address] any warranty or liability for your use of this information.          Patient Education        Muscle Cramps: Care Instructions  Your Care Instructions    A muscle cramp occurs when a muscle tightens up suddenly. A cramp often happens in the legs. A muscle cramp is also called a muscle spasm or a charley horse. Muscle cramps usually last less than a minute. However, the pain may last for several minutes. Leg cramps that occur at night may wake you up. Heavy exercise, dehydration, and being overweight can increase your risk of getting cramps. An imbalance of certain chemicals in your blood, called electrolytes, can also lead to muscle cramps. Pregnant women sometimes get muscle cramps during sleep. Muscle cramps can be treated by stretching and massaging the muscle. If cramps keep coming back, your doctor may prescribe medicine that relaxes your muscles. Follow-up care is a key part of your treatment and safety. Be sure to make and go to all appointments, and call your doctor if you are having problems. It's also a good idea to know your test results and keep a list of the medicines you take. How can you care for yourself at home? · Drink plenty of fluids to prevent dehydration. Choose water and other caffeine-free clear liquids until you feel better. If you have kidney, heart, or liver disease and have to limit fluids, talk with your doctor before you increase the amount of fluids you drink. · Stretch your muscles every day, especially before and after exercise and at bedtime. Regular stretching can relax your muscles and may prevent cramps. · Do not suddenly increase the amount of exercise you get. Increase your exercise a little each week. · When you get a cramp, stretch and massage the muscle. You can also take a warm shower or bath to relax the muscle. A heating pad placed on the muscle can also help. · Take a daily multivitamin supplement. · Ask your doctor if you can take an over-the-counter pain medicine, such as acetaminophen (Tylenol), ibuprofen (Advil, Motrin), or naproxen (Aleve). Be safe with medicines.  Read and follow all instructions on the label. When should you call for help? Watch closely for changes in your health, and be sure to contact your doctor if:    · You get muscle cramps often that do not go away after home treatment.     · Your muscle cramps often wake you up at night.     · You do not get better as expected. Where can you learn more? Go to http://deanna-whit.info/. Enter I827 in the search box to learn more about \"Muscle Cramps: Care Instructions. \"  Current as of: June 26, 2019  Content Version: 12.2  © 9250-6355 [employer]. Care instructions adapted under license by [employer] (which disclaims liability or warranty for this information). If you have questions about a medical condition or this instruction, always ask your healthcare professional. [place] [address] any warranty or liability for your use of this information. Patient Education        Back Spasm: Care Instructions  Your Care Instructions  A back spasm is sudden tightness and pain in your back muscles. It may happen from overuse or an injury. Things like sleeping in an awkward way, bending, lifting, standing, or sitting can sometimes cause a spasm. But the cause isn't always clear. Home treatment includes using heat or ice, taking over-the-counter (OTC) pain medicines, and avoiding activities that may cause back pain. For a back spasm that doesn't get better with home care, your doctor may prescribe medicine. Treatments such as massage or manipulation may also help ease a back spasm. Your doctor may also suggest exercise or physical therapy to help improve strength and flexibility in your back muscles. In most cases, getting back to your normal activities is good for your back. Just make sure to avoid doing things that make your pain worse. Follow-up care is a key part of your treatment and safety.  Be sure to make and go to all appointments, and call your doctor if you are having problems. It's also a good idea to know your test results and keep a list of the medicines you take. How can you care for yourself at home?   Heat, ice, and medicines    · To relieve pain, use heat or ice (whichever feels better) on the affected area. ? Put a warm water bottle, a heating pad set on low, or a warm cloth on your back. Put a thin cloth between the heating pad and your skin. Do not go to sleep with a heating pad on your skin. ? Try ice or a cold pack on the area for 10 to 20 minutes at a time. Put a thin cloth between the ice and your skin.     · For most back pain you can take over-the-counter pain medicine. Nonsteroidal anti-inflammatory drugs (NSAIDs) such as ibuprofen or naproxen seem to work best. But if you can't take NSAIDs you can try acetaminophen. Your doctor can prescribe stronger medicines if needed. Be safe with medicines. Read and follow all instructions on the label.    Body positions and posture    · Sit or lie in positions that are most comfortable for you and that reduce pain. Try one of these positions when you lie down:  ? Lie on your back with your knees bent and supported by large pillows. ? Lie on the floor with your legs on the seat of a sofa or chair. ? Lie on your side with your knees and hips bent and a pillow between your legs. ? Lie on your stomach if it does not make pain worse.     · Do not sit up in bed. Avoid soft couches and twisted positions.     · Avoid bed rest after the first day of back pain. Bed rest can help relieve pain at first, but it delays healing. Continued rest without activity is usually not good for your back.     · If you must sit for long periods of time, take breaks from sitting. Change positions every 30 minutes. Get up and walk around, or lie in a comfortable position. Activity    · Take short walks several times a day. You can start with 5 to 10 minutes, 3 or 4 times a day, and work up to longer walks.  Walk on level surfaces and avoid hills and stairs until your back starts to feel better.     · After your back spasm starts to feel better, try to stretch your muscles every day, especially before and after exercise and at bedtime. Regular stretching can help relax your muscles.     · To prevent future back pain, do exercises to stretch and strengthen your back and stomach. Learn to use good posture, safe lifting techniques, and other ways to move to help you avoid back pain. When should you call for help? Call 911 anytime you think you may need emergency care. For example, call if:    · You are unable to move an arm or a leg at all.   [facility] your doctor now or seek immediate medical care if:    · You have new or worse symptoms in your legs, belly, or buttocks. Symptoms may include:  ? Numbness or tingling. ? Weakness. ? Pain.     · You lose bladder or bowel control.    Watch closely for changes in your health, and be sure to contact your doctor if:    · You have a fever, lose weight, or don't feel well.     · You do not get better as expected. Where can you learn more? Go to http://deanna-whit.info/. Enter E232 in the search box to learn more about \"Back Spasm: Care Instructions. \"  Current as of: June 26, 2019  Content Version: 12.2  © 6021-5980 [employer]. Care instructions adapted under license by [employer] (which disclaims liability or warranty for this information). If you have questions about a medical condition or this instruction, always ask your healthcare professional. [place] [address] any warranty or liability for your use of this information. Patient Education        Urethritis: Care Instructions  Your Care Instructions    Urethritis is an infection of the tube that takes urine from the bladder to the outside of the body. This tube is called the urethra. The infection is often caused by bacteria.  This can happen if you have a sexually transmitted infection (STI). But a virus may also be a cause. Urethritis is usually treated with antibiotics. Most cases clear up with treatment. Proper treatment is very important. If you don't treat it, the infection can lead to lasting damage of the urethra. Other parts of the urinary system can also be damaged. Follow-up care is a key part of your treatment and safety. Be sure to make and go to all appointments, and call your doctor if you are having problems. It's also a good idea to know your test results and keep a list of the medicines you take. How can you care for yourself at home? · If your doctor prescribed antibiotics, take them as directed. Do not stop taking them just because you feel better. You need to take the full course of antibiotics. · Take an over-the-counter pain medicine, such as acetaminophen (Tylenol), ibuprofen (Advil, Motrin), or naproxen (Aleve), if needed. Be safe with medicines. Read and follow all instructions on the label. · Do not take two or more pain medicines at the same time unless the doctor told you to. Many pain medicines have acetaminophen, which is Tylenol. Too much acetaminophen (Tylenol) can be harmful. · Your doctor may have you take phenazopyridine (Pyridium). This is a pain medicine for the urinary tract. It can turn your urine a deep red-orange. This is normal. Call your doctor if you think you are having a problem with your medicine. · Do not have sex until you are done with treatment. If you do have sex, be sure to use a condom. Your sex partner or partners should be tested too if your urethritis was caused by an STI. · If your infection was caused by an injury or chemicals, avoid those things if you can. When should you call for help? Call your doctor now or seek immediate medical care if:    · You can't urinate.     · You have symptoms of a urinary infection. For example:  ? You have blood or pus in your urine. ?  You have pain in your back just below your rib cage. This is called flank pain. ? You have a fever, chills, or body aches. ? It hurts to urinate. ? You have groin or belly pain.     · You have a hard time urinating when your bladder is full.     · You notice mental changes or feel confused.    Watch closely for changes in your health, and be sure to contact your doctor if:    · You do not get better as expected. Where can you learn more? Go to http://deanna-whit.info/. Enter J144 in the search box to learn more about \"Urethritis: Care Instructions. \"  Current as of: December 19, 2018  Content Version: 12.2  © 1384-3990 [employer]. Care instructions adapted under license by [employer] (which disclaims liability or warranty for this information). If you have questions about a medical condition or this instruction, always ask your healthcare professional. [address] any warranty or liability for your use of this information.

## 2020-01-10 LAB
BACTERIA SPEC CULT: NORMAL
C TRACH DNA SPEC QL NAA+PROBE: NEGATIVE
CC UR VC: NORMAL
N GONORRHOEA DNA SPEC QL NAA+PROBE: POSITIVE
SAMPLE TYPE: ABNORMAL
SERVICE CMNT-IMP: ABNORMAL
SERVICE CMNT-IMP: NORMAL
SPECIMEN SOURCE: ABNORMAL

## 2020-03-17 ENCOUNTER — HOSPITAL ENCOUNTER (INPATIENT)
Age: 25
LOS: 2 days | Discharge: HOME | DRG: 816 | End: 2020-03-20
Attending: STUDENT IN AN ORGANIZED HEALTH CARE EDUCATION/TRAINING PROGRAM | Admitting: INTERNAL MEDICINE
Payer: COMMERCIAL

## 2020-03-17 DIAGNOSIS — J81.0 ACUTE PULMONARY EDEMA (HCC): ICD-10-CM

## 2020-03-17 DIAGNOSIS — J96.01 ACUTE RESPIRATORY FAILURE WITH HYPOXIA (HCC): Primary | ICD-10-CM

## 2020-03-17 DIAGNOSIS — T50.901A ACCIDENTAL DRUG OVERDOSE, INITIAL ENCOUNTER: ICD-10-CM

## 2020-03-17 PROCEDURE — 36415 COLL VENOUS BLD VENIPUNCTURE: CPT

## 2020-03-17 PROCEDURE — 85610 PROTHROMBIN TIME: CPT

## 2020-03-17 PROCEDURE — 83605 ASSAY OF LACTIC ACID: CPT

## 2020-03-17 PROCEDURE — 96361 HYDRATE IV INFUSION ADD-ON: CPT

## 2020-03-17 PROCEDURE — 94762 N-INVAS EAR/PLS OXIMTRY CONT: CPT

## 2020-03-17 PROCEDURE — 93005 ELECTROCARDIOGRAM TRACING: CPT

## 2020-03-17 PROCEDURE — 83036 HEMOGLOBIN GLYCOSYLATED A1C: CPT

## 2020-03-17 PROCEDURE — 85025 COMPLETE CBC W/AUTO DIFF WBC: CPT

## 2020-03-17 PROCEDURE — 80053 COMPREHEN METABOLIC PANEL: CPT

## 2020-03-17 PROCEDURE — 99285 EMERGENCY DEPT VISIT HI MDM: CPT

## 2020-03-17 PROCEDURE — 80076 HEPATIC FUNCTION PANEL: CPT

## 2020-03-17 PROCEDURE — 85730 THROMBOPLASTIN TIME PARTIAL: CPT

## 2020-03-17 PROCEDURE — 84145 PROCALCITONIN (PCT): CPT

## 2020-03-17 PROCEDURE — 74011250636 HC RX REV CODE- 250/636: Performed by: STUDENT IN AN ORGANIZED HEALTH CARE EDUCATION/TRAINING PROGRAM

## 2020-03-17 PROCEDURE — 80307 DRUG TEST PRSMV CHEM ANLYZR: CPT

## 2020-03-17 PROCEDURE — 96374 THER/PROPH/DIAG INJ IV PUSH: CPT

## 2020-03-17 RX ADMIN — SODIUM CHLORIDE 1000 ML: 900 INJECTION, SOLUTION INTRAVENOUS at 23:48

## 2020-03-18 ENCOUNTER — APPOINTMENT (OUTPATIENT)
Dept: CT IMAGING | Age: 25
DRG: 816 | End: 2020-03-18
Payer: COMMERCIAL

## 2020-03-18 ENCOUNTER — APPOINTMENT (OUTPATIENT)
Dept: GENERAL RADIOLOGY | Age: 25
DRG: 816 | End: 2020-03-18
Attending: STUDENT IN AN ORGANIZED HEALTH CARE EDUCATION/TRAINING PROGRAM
Payer: COMMERCIAL

## 2020-03-18 PROBLEM — J18.9 PNEUMONIA: Status: ACTIVE | Noted: 2020-03-18

## 2020-03-18 LAB
ALBUMIN SERPL-MCNC: 3.3 G/DL (ref 3.5–5)
ALBUMIN SERPL-MCNC: 3.4 G/DL (ref 3.5–5)
ALBUMIN/GLOB SERPL: 0.9 {RATIO} (ref 1.1–2.2)
ALBUMIN/GLOB SERPL: 0.9 {RATIO} (ref 1.1–2.2)
ALP SERPL-CCNC: 62 U/L (ref 45–117)
ALP SERPL-CCNC: 67 U/L (ref 45–117)
ALT SERPL-CCNC: 14 U/L (ref 12–78)
ALT SERPL-CCNC: 15 U/L (ref 12–78)
AMPHET UR QL SCN: NEGATIVE
ANION GAP SERPL CALC-SCNC: 6 MMOL/L (ref 5–15)
ANION GAP SERPL CALC-SCNC: 9 MMOL/L (ref 5–15)
APAP SERPL-MCNC: <2 UG/ML (ref 10–30)
APPEARANCE UR: CLEAR
APTT PPP: 23.6 SEC (ref 22.1–32)
ARTERIAL PATENCY WRIST A: YES
AST SERPL-CCNC: 22 U/L (ref 15–37)
AST SERPL-CCNC: 22 U/L (ref 15–37)
ATRIAL RATE: 70 BPM
B PERT DNA SPEC QL NAA+PROBE: NOT DETECTED
BACTERIA URNS QL MICRO: NEGATIVE /HPF
BARBITURATES UR QL SCN: NEGATIVE
BASE DEFICIT BLD-SCNC: 2 MMOL/L
BASOPHILS # BLD: 0.1 K/UL (ref 0–0.1)
BASOPHILS NFR BLD: 1 % (ref 0–1)
BDY SITE: ABNORMAL
BENZODIAZ UR QL: NEGATIVE
BILIRUB DIRECT SERPL-MCNC: 0.1 MG/DL (ref 0–0.2)
BILIRUB SERPL-MCNC: 0.5 MG/DL (ref 0.2–1)
BILIRUB SERPL-MCNC: 0.5 MG/DL (ref 0.2–1)
BILIRUB UR QL: NEGATIVE
BNP SERPL-MCNC: 11 PG/ML
BORDETELLA PARAPERTUSSIS PCR, BORPAR: NOT DETECTED
BUN SERPL-MCNC: 10 MG/DL (ref 6–20)
BUN SERPL-MCNC: 7 MG/DL (ref 6–20)
BUN/CREAT SERPL: 7 (ref 12–20)
BUN/CREAT SERPL: 8 (ref 12–20)
C PNEUM DNA SPEC QL NAA+PROBE: NOT DETECTED
CA-I BLD-SCNC: 1.15 MMOL/L (ref 1.12–1.32)
CALCIUM SERPL-MCNC: 8.2 MG/DL (ref 8.5–10.1)
CALCIUM SERPL-MCNC: 8.5 MG/DL (ref 8.5–10.1)
CALCULATED P AXIS, ECG09: 62 DEGREES
CALCULATED R AXIS, ECG10: 69 DEGREES
CALCULATED T AXIS, ECG11: 62 DEGREES
CANNABINOIDS UR QL SCN: POSITIVE
CHLORIDE SERPL-SCNC: 101 MMOL/L (ref 97–108)
CHLORIDE SERPL-SCNC: 103 MMOL/L (ref 97–108)
CO2 SERPL-SCNC: 25 MMOL/L (ref 21–32)
CO2 SERPL-SCNC: 28 MMOL/L (ref 21–32)
COCAINE UR QL SCN: POSITIVE
COLOR UR: ABNORMAL
CREAT SERPL-MCNC: 0.94 MG/DL (ref 0.7–1.3)
CREAT SERPL-MCNC: 1.25 MG/DL (ref 0.7–1.3)
DIAGNOSIS, 93000: NORMAL
DIFFERENTIAL METHOD BLD: ABNORMAL
DRUG SCRN COMMENT,DRGCM: ABNORMAL
EOSINOPHIL # BLD: 0.1 K/UL (ref 0–0.4)
EOSINOPHIL NFR BLD: 1 % (ref 0–7)
EPITH CASTS URNS QL MICRO: ABNORMAL /LPF
ERYTHROCYTE [DISTWIDTH] IN BLOOD BY AUTOMATED COUNT: 13.3 % (ref 11.5–14.5)
EST. AVERAGE GLUCOSE BLD GHB EST-MCNC: 114 MG/DL
ETHANOL SERPL-MCNC: <10 MG/DL
FLUAV AG NPH QL IA: NEGATIVE
FLUAV H1 2009 PAND RNA SPEC QL NAA+PROBE: NOT DETECTED
FLUAV H1 RNA SPEC QL NAA+PROBE: NOT DETECTED
FLUAV H3 RNA SPEC QL NAA+PROBE: NOT DETECTED
FLUAV SUBTYP SPEC NAA+PROBE: NOT DETECTED
FLUBV AG NOSE QL IA: NEGATIVE
FLUBV RNA SPEC QL NAA+PROBE: NOT DETECTED
GAS FLOW.O2 O2 DELIVERY SYS: ABNORMAL L/MIN
GAS FLOW.O2 SETTING OXYMISER: 15 L/M
GLOBULIN SER CALC-MCNC: 3.5 G/DL (ref 2–4)
GLOBULIN SER CALC-MCNC: 3.6 G/DL (ref 2–4)
GLUCOSE BLD STRIP.AUTO-MCNC: 100 MG/DL (ref 65–100)
GLUCOSE SERPL-MCNC: 248 MG/DL (ref 65–100)
GLUCOSE SERPL-MCNC: 95 MG/DL (ref 65–100)
GLUCOSE UR STRIP.AUTO-MCNC: 500 MG/DL
HADV DNA SPEC QL NAA+PROBE: NOT DETECTED
HBA1C MFR BLD: 5.6 % (ref 4–5.6)
HCO3 BLD-SCNC: 24.2 MMOL/L (ref 22–26)
HCOV 229E RNA SPEC QL NAA+PROBE: NOT DETECTED
HCOV HKU1 RNA SPEC QL NAA+PROBE: NOT DETECTED
HCOV NL63 RNA SPEC QL NAA+PROBE: NOT DETECTED
HCOV OC43 RNA SPEC QL NAA+PROBE: NOT DETECTED
HCT VFR BLD AUTO: 44.2 % (ref 36.6–50.3)
HGB BLD-MCNC: 14.5 G/DL (ref 12.1–17)
HGB UR QL STRIP: NEGATIVE
HIV 1+2 AB+HIV1 P24 AG SERPL QL IA: NONREACTIVE
HIV12 RESULT COMMENT, HHIVC: NORMAL
HMPV RNA SPEC QL NAA+PROBE: NOT DETECTED
HPIV1 RNA SPEC QL NAA+PROBE: NOT DETECTED
HPIV2 RNA SPEC QL NAA+PROBE: NOT DETECTED
HPIV3 RNA SPEC QL NAA+PROBE: NOT DETECTED
HPIV4 RNA SPEC QL NAA+PROBE: NOT DETECTED
HYALINE CASTS URNS QL MICRO: ABNORMAL /LPF (ref 0–5)
IMM GRANULOCYTES # BLD AUTO: 0 K/UL
IMM GRANULOCYTES NFR BLD AUTO: 0 %
INR PPP: 1.1 (ref 0.9–1.1)
KETONES UR QL STRIP.AUTO: NEGATIVE MG/DL
LACTATE SERPL-SCNC: 1.2 MMOL/L (ref 0.4–2)
LACTATE SERPL-SCNC: 3.1 MMOL/L (ref 0.4–2)
LEUKOCYTE ESTERASE UR QL STRIP.AUTO: NEGATIVE
LYMPHOCYTES # BLD: 2.7 K/UL (ref 0.8–3.5)
LYMPHOCYTES NFR BLD: 34 % (ref 12–49)
M PNEUMO DNA SPEC QL NAA+PROBE: NOT DETECTED
MCH RBC QN AUTO: 31.3 PG (ref 26–34)
MCHC RBC AUTO-ENTMCNC: 32.8 G/DL (ref 30–36.5)
MCV RBC AUTO: 95.3 FL (ref 80–99)
METHADONE UR QL: NEGATIVE
MONOCYTES # BLD: 0.3 K/UL (ref 0–1)
MONOCYTES NFR BLD: 4 % (ref 5–13)
MYELOCYTES NFR BLD MANUAL: 2 %
NEUTS BAND NFR BLD MANUAL: 1 % (ref 0–6)
NEUTS SEG # BLD: 4.5 K/UL (ref 1.8–8)
NEUTS SEG NFR BLD: 57 % (ref 32–75)
NITRITE UR QL STRIP.AUTO: NEGATIVE
NRBC # BLD: 0 K/UL (ref 0–0.01)
NRBC BLD-RTO: 0 PER 100 WBC
O2/TOTAL GAS SETTING VFR VENT: 100 %
OPIATES UR QL: NEGATIVE
P-R INTERVAL, ECG05: 190 MS
PCO2 BLD: 45.5 MMHG (ref 35–45)
PCP UR QL: NEGATIVE
PH BLD: 7.33 [PH] (ref 7.35–7.45)
PH UR STRIP: 6 [PH] (ref 5–8)
PLATELET # BLD AUTO: 238 K/UL (ref 150–400)
PLATELET COMMENTS,PCOM: ABNORMAL
PMV BLD AUTO: 9.6 FL (ref 8.9–12.9)
PO2 BLD: 58 MMHG (ref 80–100)
POTASSIUM SERPL-SCNC: 3.4 MMOL/L (ref 3.5–5.1)
POTASSIUM SERPL-SCNC: 3.5 MMOL/L (ref 3.5–5.1)
PROCALCITONIN SERPL-MCNC: <0.05 NG/ML
PROT SERPL-MCNC: 6.8 G/DL (ref 6.4–8.2)
PROT SERPL-MCNC: 7 G/DL (ref 6.4–8.2)
PROT UR STRIP-MCNC: 100 MG/DL
PROTHROMBIN TIME: 11.5 SEC (ref 9–11.1)
Q-T INTERVAL, ECG07: 396 MS
QRS DURATION, ECG06: 104 MS
QTC CALCULATION (BEZET), ECG08: 427 MS
RBC # BLD AUTO: 4.64 M/UL (ref 4.1–5.7)
RBC #/AREA URNS HPF: ABNORMAL /HPF (ref 0–5)
RBC MORPH BLD: ABNORMAL
RBC MORPH BLD: ABNORMAL
RSV RNA SPEC QL NAA+PROBE: NOT DETECTED
RV+EV RNA SPEC QL NAA+PROBE: NOT DETECTED
SALICYLATES SERPL-MCNC: <1.7 MG/DL (ref 2.8–20)
SAO2 % BLD: 88 % (ref 92–97)
SERVICE CMNT-IMP: NORMAL
SODIUM SERPL-SCNC: 135 MMOL/L (ref 136–145)
SODIUM SERPL-SCNC: 137 MMOL/L (ref 136–145)
SP GR UR REFRACTOMETRY: 1.02 (ref 1–1.03)
SPECIMEN TYPE: ABNORMAL
THERAPEUTIC RANGE,PTTT: NORMAL SECS (ref 58–77)
TOTAL RESP. RATE, ITRR: 26
TROPONIN I SERPL-MCNC: <0.05 NG/ML
UR CULT HOLD, URHOLD: NORMAL
UROBILINOGEN UR QL STRIP.AUTO: 1 EU/DL (ref 0.2–1)
VENTRICULAR RATE, ECG03: 70 BPM
WBC # BLD AUTO: 7.8 K/UL (ref 4.1–11.1)
WBC URNS QL MICRO: ABNORMAL /HPF (ref 0–4)

## 2020-03-18 PROCEDURE — 74011250637 HC RX REV CODE- 250/637: Performed by: INTERNAL MEDICINE

## 2020-03-18 PROCEDURE — 94762 N-INVAS EAR/PLS OXIMTRY CONT: CPT

## 2020-03-18 PROCEDURE — 83880 ASSAY OF NATRIURETIC PEPTIDE: CPT

## 2020-03-18 PROCEDURE — 82803 BLOOD GASES ANY COMBINATION: CPT

## 2020-03-18 PROCEDURE — 77030029684 HC NEB SM VOL KT MONA -A

## 2020-03-18 PROCEDURE — 0100U RESPIRATORY PANEL,PCR,NASOPHARYNGEAL: CPT

## 2020-03-18 PROCEDURE — 74011000258 HC RX REV CODE- 258: Performed by: NURSE PRACTITIONER

## 2020-03-18 PROCEDURE — 81001 URINALYSIS AUTO W/SCOPE: CPT

## 2020-03-18 PROCEDURE — 82962 GLUCOSE BLOOD TEST: CPT

## 2020-03-18 PROCEDURE — 96374 THER/PROPH/DIAG INJ IV PUSH: CPT

## 2020-03-18 PROCEDURE — 74011250636 HC RX REV CODE- 250/636: Performed by: NURSE PRACTITIONER

## 2020-03-18 PROCEDURE — 74011250636 HC RX REV CODE- 250/636: Performed by: STUDENT IN AN ORGANIZED HEALTH CARE EDUCATION/TRAINING PROGRAM

## 2020-03-18 PROCEDURE — 94640 AIRWAY INHALATION TREATMENT: CPT

## 2020-03-18 PROCEDURE — 74011636320 HC RX REV CODE- 636/320: Performed by: RADIOLOGY

## 2020-03-18 PROCEDURE — 87040 BLOOD CULTURE FOR BACTERIA: CPT

## 2020-03-18 PROCEDURE — 71045 X-RAY EXAM CHEST 1 VIEW: CPT

## 2020-03-18 PROCEDURE — 74011250636 HC RX REV CODE- 250/636: Performed by: INTERNAL MEDICINE

## 2020-03-18 PROCEDURE — 74011000258 HC RX REV CODE- 258: Performed by: RADIOLOGY

## 2020-03-18 PROCEDURE — 87804 INFLUENZA ASSAY W/OPTIC: CPT

## 2020-03-18 PROCEDURE — 83605 ASSAY OF LACTIC ACID: CPT

## 2020-03-18 PROCEDURE — 36600 WITHDRAWAL OF ARTERIAL BLOOD: CPT

## 2020-03-18 PROCEDURE — 80048 BASIC METABOLIC PNL TOTAL CA: CPT

## 2020-03-18 PROCEDURE — 65610000006 HC RM INTENSIVE CARE

## 2020-03-18 PROCEDURE — 74011000250 HC RX REV CODE- 250: Performed by: NURSE PRACTITIONER

## 2020-03-18 PROCEDURE — 74011250637 HC RX REV CODE- 250/637: Performed by: NURSE PRACTITIONER

## 2020-03-18 PROCEDURE — 96361 HYDRATE IV INFUSION ADD-ON: CPT

## 2020-03-18 PROCEDURE — 86038 ANTINUCLEAR ANTIBODIES: CPT

## 2020-03-18 PROCEDURE — 77010033711 HC HIGH FLOW OXYGEN

## 2020-03-18 PROCEDURE — 80307 DRUG TEST PRSMV CHEM ANLYZR: CPT

## 2020-03-18 PROCEDURE — 84484 ASSAY OF TROPONIN QUANT: CPT

## 2020-03-18 PROCEDURE — 71275 CT ANGIOGRAPHY CHEST: CPT

## 2020-03-18 PROCEDURE — 87389 HIV-1 AG W/HIV-1&-2 AB AG IA: CPT

## 2020-03-18 PROCEDURE — 36415 COLL VENOUS BLD VENIPUNCTURE: CPT

## 2020-03-18 RX ORDER — SODIUM CHLORIDE 0.9 % (FLUSH) 0.9 %
5-40 SYRINGE (ML) INJECTION AS NEEDED
Status: DISCONTINUED | OUTPATIENT
Start: 2020-03-18 | End: 2020-03-20 | Stop reason: HOSPADM

## 2020-03-18 RX ORDER — IBUPROFEN 200 MG
1 TABLET ORAL DAILY PRN
Status: DISCONTINUED | OUTPATIENT
Start: 2020-03-18 | End: 2020-03-20 | Stop reason: HOSPADM

## 2020-03-18 RX ORDER — VANCOMYCIN HYDROCHLORIDE
1250 ONCE
Status: DISCONTINUED | OUTPATIENT
Start: 2020-03-18 | End: 2020-03-18

## 2020-03-18 RX ORDER — VANCOMYCIN/0.9 % SOD CHLORIDE 1.5G/250ML
1500 PLASTIC BAG, INJECTION (ML) INTRAVENOUS ONCE
Status: COMPLETED | OUTPATIENT
Start: 2020-03-18 | End: 2020-03-18

## 2020-03-18 RX ORDER — FOLIC ACID 1 MG/1
1 TABLET ORAL DAILY
Status: DISCONTINUED | OUTPATIENT
Start: 2020-03-18 | End: 2020-03-20 | Stop reason: HOSPADM

## 2020-03-18 RX ORDER — SODIUM CHLORIDE 0.9 % (FLUSH) 0.9 %
10 SYRINGE (ML) INJECTION
Status: COMPLETED | OUTPATIENT
Start: 2020-03-18 | End: 2020-03-18

## 2020-03-18 RX ORDER — ENOXAPARIN SODIUM 100 MG/ML
40 INJECTION SUBCUTANEOUS EVERY 24 HOURS
Status: DISCONTINUED | OUTPATIENT
Start: 2020-03-18 | End: 2020-03-20 | Stop reason: HOSPADM

## 2020-03-18 RX ORDER — ONDANSETRON 2 MG/ML
4 INJECTION INTRAMUSCULAR; INTRAVENOUS
Status: DISCONTINUED | OUTPATIENT
Start: 2020-03-18 | End: 2020-03-20 | Stop reason: HOSPADM

## 2020-03-18 RX ORDER — FUROSEMIDE 10 MG/ML
20 INJECTION INTRAMUSCULAR; INTRAVENOUS ONCE
Status: COMPLETED | OUTPATIENT
Start: 2020-03-18 | End: 2020-03-18

## 2020-03-18 RX ORDER — IPRATROPIUM BROMIDE AND ALBUTEROL SULFATE 2.5; .5 MG/3ML; MG/3ML
3 SOLUTION RESPIRATORY (INHALATION)
Status: DISCONTINUED | OUTPATIENT
Start: 2020-03-18 | End: 2020-03-19

## 2020-03-18 RX ORDER — VANCOMYCIN HYDROCHLORIDE
1250 EVERY 12 HOURS
Status: DISCONTINUED | OUTPATIENT
Start: 2020-03-18 | End: 2020-03-18

## 2020-03-18 RX ORDER — NALOXONE HYDROCHLORIDE 1 MG/ML
0.2 INJECTION INTRAMUSCULAR; INTRAVENOUS; SUBCUTANEOUS
Status: COMPLETED | OUTPATIENT
Start: 2020-03-18 | End: 2020-03-18

## 2020-03-18 RX ORDER — NALOXONE HYDROCHLORIDE 0.4 MG/ML
0.4 INJECTION, SOLUTION INTRAMUSCULAR; INTRAVENOUS; SUBCUTANEOUS AS NEEDED
Status: DISCONTINUED | OUTPATIENT
Start: 2020-03-18 | End: 2020-03-20 | Stop reason: HOSPADM

## 2020-03-18 RX ORDER — SODIUM CHLORIDE 0.9 % (FLUSH) 0.9 %
5-40 SYRINGE (ML) INJECTION EVERY 8 HOURS
Status: DISCONTINUED | OUTPATIENT
Start: 2020-03-18 | End: 2020-03-20 | Stop reason: HOSPADM

## 2020-03-18 RX ADMIN — IPRATROPIUM BROMIDE AND ALBUTEROL SULFATE 3 ML: .5; 3 SOLUTION RESPIRATORY (INHALATION) at 20:41

## 2020-03-18 RX ADMIN — ENOXAPARIN SODIUM 40 MG: 40 INJECTION SUBCUTANEOUS at 03:42

## 2020-03-18 RX ADMIN — FUROSEMIDE 20 MG: 10 INJECTION, SOLUTION INTRAMUSCULAR; INTRAVENOUS at 11:20

## 2020-03-18 RX ADMIN — IOPAMIDOL 70 ML: 755 INJECTION, SOLUTION INTRAVENOUS at 02:15

## 2020-03-18 RX ADMIN — NALOXONE HYDROCHLORIDE 0.4 MG: 0.4 INJECTION, SOLUTION INTRAMUSCULAR; INTRAVENOUS; SUBCUTANEOUS at 03:29

## 2020-03-18 RX ADMIN — IPRATROPIUM BROMIDE AND ALBUTEROL SULFATE 3 ML: .5; 3 SOLUTION RESPIRATORY (INHALATION) at 13:28

## 2020-03-18 RX ADMIN — FOLIC ACID 1 MG: 1 TABLET ORAL at 11:30

## 2020-03-18 RX ADMIN — FAMOTIDINE 20 MG: 10 INJECTION, SOLUTION INTRAVENOUS at 03:29

## 2020-03-18 RX ADMIN — ONDANSETRON 4 MG: 2 INJECTION, SOLUTION INTRAMUSCULAR; INTRAVENOUS at 11:20

## 2020-03-18 RX ADMIN — NALOXONE HYDROCHLORIDE 0.2 MG: 1 INJECTION PARENTERAL at 01:30

## 2020-03-18 RX ADMIN — Medication 10 ML: at 17:44

## 2020-03-18 RX ADMIN — Medication 10 ML: at 22:00

## 2020-03-18 RX ADMIN — IPRATROPIUM BROMIDE AND ALBUTEROL SULFATE 3 ML: .5; 3 SOLUTION RESPIRATORY (INHALATION) at 08:48

## 2020-03-18 RX ADMIN — VANCOMYCIN HYDROCHLORIDE 1500 MG: 10 INJECTION, POWDER, LYOPHILIZED, FOR SOLUTION INTRAVENOUS at 05:56

## 2020-03-18 RX ADMIN — Medication 10 ML: at 05:46

## 2020-03-18 RX ADMIN — SODIUM CHLORIDE 100 ML: 9 INJECTION, SOLUTION INTRAVENOUS at 02:15

## 2020-03-18 RX ADMIN — THIAMINE HYDROCHLORIDE 100 MG: 100 INJECTION, SOLUTION INTRAMUSCULAR; INTRAVENOUS at 11:30

## 2020-03-18 RX ADMIN — POTASSIUM BICARBONATE 40 MEQ: 782 TABLET, EFFERVESCENT ORAL at 11:21

## 2020-03-18 RX ADMIN — Medication 10 ML: at 02:15

## 2020-03-18 RX ADMIN — PIPERACILLIN AND TAZOBACTAM 3.38 G: 3; .375 INJECTION, POWDER, LYOPHILIZED, FOR SOLUTION INTRAVENOUS at 04:43

## 2020-03-18 NOTE — ED PROVIDER NOTES
Patient is a 26-year-old male who was brought in by EMS from the street after an apparent overdose. Paramedic report the patient was found on the ground, unresponsive, outside of an apartment complex. Pupils were pinpoint and patient's respiratory rate was 6. Patient became more responsive after 0.75 mg of Narcan IV. Sugar was elevated at 409. No history of diabetes reported. Patient unsure of what happened, denies ingestion or drug use. Drug Overdose          History reviewed. No pertinent past medical history. History reviewed. No pertinent surgical history. History reviewed. No pertinent family history.     Social History     Socioeconomic History    Marital status: SINGLE     Spouse name: Not on file    Number of children: Not on file    Years of education: Not on file    Highest education level: Not on file   Occupational History    Not on file   Social Needs    Financial resource strain: Not on file    Food insecurity     Worry: Not on file     Inability: Not on file    Transportation needs     Medical: Not on file     Non-medical: Not on file   Tobacco Use    Smoking status: Current Every Day Smoker   Substance and Sexual Activity    Alcohol use: Yes     Comment: sometimes    Drug use: No    Sexual activity: Not on file   Lifestyle    Physical activity     Days per week: Not on file     Minutes per session: Not on file    Stress: Not on file   Relationships    Social connections     Talks on phone: Not on file     Gets together: Not on file     Attends Restoration service: Not on file     Active member of club or organization: Not on file     Attends meetings of clubs or organizations: Not on file     Relationship status: Not on file    Intimate partner violence     Fear of current or ex partner: Not on file     Emotionally abused: Not on file     Physically abused: Not on file     Forced sexual activity: Not on file   Other Topics Concern    Not on file   Social History Narrative    Not on file         ALLERGIES: Patient has no known allergies. Review of Systems   Unable to perform ROS: Acuity of condition       There were no vitals filed for this visit. Physical Exam  Nursing note reviewed. Constitutional:       General: He is not in acute distress. Appearance: He is well-developed. HENT:      Head: Normocephalic and atraumatic. Eyes:      Extraocular Movements: Extraocular movements intact. Pupils: Pupils are equal, round, and reactive to light. Neck:      Musculoskeletal: Normal range of motion and neck supple. Cardiovascular:      Rate and Rhythm: Normal rate and regular rhythm. Heart sounds: Normal heart sounds. Pulmonary:      Effort: Pulmonary effort is normal. No respiratory distress. Breath sounds: Normal breath sounds. Abdominal:      Palpations: Abdomen is soft. Tenderness: There is no abdominal tenderness. There is no guarding. Musculoskeletal: Normal range of motion. Skin:     General: Skin is warm and dry. Neurological:      General: No focal deficit present. Mental Status: He is alert and oriented to person, place, and time. Motor: No abnormal muscle tone. [facility]       Procedures      Assessment and plan: This is a 77-year-old male brought in by EMS after he was found unresponsive on the street. Clinical symptoms reported by paramedics consistent with opioid toxicity. Will need work-up due to hyperglycemia and continued altered mentation. Disposition depends on results and clinical course. EKG interpretation: 23:50  Rhythm: sinus rhythm with sinus arrhythmia; and irregular. Rate (approx.): 70;  Axis: normal; Intervals: normal ; ST/T wave: minimal j point elevated in multiple leads, mostly precordial leads, otherwise unremarkable; EKG documented and interpreted by Cierra Pinon MD, ED MD.       [address] [facility] for Admission  1:06 AM    ED Room Number: ER08/08  Patient Name and age:  Ambar Boogie 25 y.o.  male  Working Diagnosis:   1. Acute respiratory failure with hypoxia ([address]    2. Acute pulmonary edema (HCC)    3. Accidental drug overdose, initial encounter      Readmission: no  Isolation Requirements:  no  Recommended Level of Care:  telemetry  Code Status:  Full Code  Department:[facility] Adult ED - 21   Other:  Responded to Narcan in the field for opioid toxidrome    MEDICAL DECISION MAKIN y.o. male with no significant past medical history presents with hypoxic respiratory failure after apparent drug overdose.        LABORATORY TESTS:  Labs Reviewed   CBC WITH AUTOMATED DIFF - Abnormal; Notable for the following components:       Result Value    MONOCYTES 4 (*)     MYELOCYTES 2 (*)     All other components within normal limits   METABOLIC PANEL, COMPREHENSIVE - Abnormal; Notable for the following components:    Potassium 3.4 (*)     Glucose 248 (*)     BUN/Creatinine ratio 8 (*)     Calcium 8.2 (*)     Albumin 3.3 (*)     A-G Ratio 0.9 (*)     All other components within normal limits   SALICYLATE - Abnormal; Notable for the following components:    Salicylate level <0.6 (*)     All other components within normal limits   DRUG SCREEN, URINE - Abnormal; Notable for the following components:    COCAINE POSITIVE (*)     THC (TH-CANNABINOL) POSITIVE (*)     All other components within normal limits   URINALYSIS W/MICROSCOPIC - Abnormal; Notable for the following components:    Protein 100 (*)     Glucose 500 (*)     All other components within normal limits   LACTIC ACID - Abnormal; Notable for the following components:    Lactic acid 3.1 (*)     All other components within normal limits   ACETAMINOPHEN - Abnormal; Notable for the following components:    Acetaminophen level <2 (*)     All other components within normal limits   POC EG7 - Abnormal; Notable for the following components:    pH (POC) 7.334 (*)     pCO2 (POC) 45.5 (*)     pO2 (POC) 58 (*)     sO2 (POC) 88 (*) All other components within normal limits   HEPATIC FUNCTION PANEL - Abnormal; Notable for the following components:    Albumin 3.4 (*)     A-G Ratio 0.9 (*)     All other components within normal limits   PROTHROMBIN TIME + INR - Abnormal; Notable for the following components:    Prothrombin time 11.5 (*)     All other components within normal limits   URINE CULTURE HOLD SAMPLE   CULTURE, BLOOD, PAIRED   INFLUENZA A & B AG (RAPID TEST)   RESPIRATORY PANEL,PCR,NASOPHARYNGEAL   CULTURE, RESPIRATORY/SPUTUM/BRONCH W GRAM STAIN   CULTURE, MRSA   ETHYL ALCOHOL   NT-PRO BNP   TROPONIN I   BLOOD GAS, ARTERIAL   HEMOGLOBIN A1C WITH EAG   PROCALCITONIN   PTT   CBC WITH AUTOMATED DIFF   CBC WITH AUTOMATED DIFF   HIV-1,2 P24 AG/AB SCREEN       IMAGING RESULTS:  Cta Chest W Or W Wo Cont    Result Date: 3/18/2020  CT ANGIOGRAPHY CHEST. 3/18/2020 2:31 AM INDICATION: Respiratory failure. Pulmonary edema, drug overdose. COMPARISON: Same day chest radiograph. TECHNIQUE: CT angiography of the chest was performed after the administration of 70 cc IV contrast (Isovue 370). Coronal and sagittal, and coronal MIP reconstructions were performed. CT dose reduction was achieved through use of a standardized protocol tailored for this examination and automatic exposure control for dose modulation. Adaptive statistical iterative reconstruction (ASIR) was utilized. FINDINGS: Dense perihilar and dependent airspace consolidation may represent edema, pneumonia, or aspiration. There is a small volume of endobronchial debris in the right mainstem bronchus. However, with this degree of airspace consolidation, more endobronchial debris throughout the bronchi would be expected if aspiration was the cause. Though there is no interlobular septal thickening or pleural effusions to further suggest edema, pulmonary edema is still slightly favored over infection. No pneumothorax or pleural effusion. No pulmonary embolism.  The heart size is normal. No thoracic lymphadenopathy. IMPRESSION: Multilobar, dense, airspace consolidation; edema slightly favored over pneumonia. Xr Chest Port    Result Date: 3/18/2020  PORTABLE CHEST RADIOGRAPH/S: 3/18/2020 12:31 AM INDICATION: Hypoxia, drug overdose. COMPARISON: None. TECHNIQUE: Portable frontal upright radiograph/s of the chest. FINDINGS: Perihilar airspace disease, right greater than left, likely represents edema as opposed to pneumonia. The central airways are patent. No pneumothorax or pleural effusion. IMPRESSION: Perihilar, alveolar, pulmonary edema.       MEDICATIONS GIVEN:  Medications   naloxone (NARCAN) injection 0.4 mg (0.4 mg IntraVENous Given 3/18/20 0329)   albuterol-ipratropium (DUO-NEB) 2.5 MG-0.5 MG/3 ML (has no administration in time range)   ondansetron (ZOFRAN) injection 4 mg (has no administration in time range)   sodium chloride (NS) flush 5-40 mL (has no administration in time range)   sodium chloride (NS) flush 5-40 mL (has no administration in time range)   famotidine (PF) (PEPCID) 20 mg in 0.9% sodium chloride 10 mL injection (20 mg IntraVENous Given 3/18/20 0329)   enoxaparin (LOVENOX) injection 40 mg (40 mg SubCUTAneous Given 3/18/20 0342)   piperacillin-tazobactam (ZOSYN) 3.375 g in 0.9% sodium chloride (MBP/ADV) 100 mL (has no administration in time range)   vancomycin (VANCOCIN) 1250 mg in  ml infusion (has no administration in time range)   VANCOMYCIN INFORMATION NOTE (has no administration in time range)   vancomycin (VANCOCIN) 1250 mg in  ml infusion (has no administration in time range)   sodium chloride 0.9 % bolus infusion 1,000 mL (0 mL IntraVENous IV Completed 3/18/20 0119)   naloxone (NARCAN) injection 0.2 mg (0.2 mg IntraVENous Given 3/18/20 0130)   sodium chloride 0.9 % bolus infusion 100 mL (100 mL IntraVENous New Bag 3/18/20 0215)   sodium chloride (NS) flush 10 mL (10 mL IntraVENous Given 3/18/20 0215)   iopamidoL (ISOVUE-370) 76 % injection 100 mL (70 mL IntraVENous Given 3/18/20 0215)       ED COURSE:  Stable    IMPRESSION:  1. Acute respiratory failure with hypoxia ([address]    2. Acute pulmonary edema (HCC)    3.  Accidental drug overdose, initial encounter        PLAN:  - Admit to ICU    CONDITION:  critical    Total critical care time spent exclusive of procedures:  42 minutes    Sander Olivas MD

## 2020-03-18 NOTE — PROGRESS NOTES
0315: Pt arrived to ICU via stretcher, on 15 L nonrebreather, transitioned to Hi Flow once in room. Pt is drowsy, oriented x 4, follows commands, SPEARS.    0329: Michele Coffman NP in room evaluating pt. Pt became more drowsy, falling asleep during assessment and slow to answer questions. PRN narcan administered; pt became alert, able to complete assessment. Primary Nurse Justin Raya RN and Dawna Gannon RN performed a dual skin assessment on this patient No impairment noted  Jitendra score is 17.    0730: Bedside and Verbal shift change report given to Basilia BENTON (oncoming nurse) by Vina Cockayne RN (offgoing nurse). Report included the following information SBAR, Kardex, ED Summary, Intake/Output, MAR, Recent Results, Cardiac Rhythm NSR and Alarm Parameters .

## 2020-03-18 NOTE — ROUTINE PROCESS
TRANSFER - OUT REPORT: 
 
Verbal report given to [address] [facility] [address] Svitlana RN(name) on Logan  being transferred to 7S ICU(unit) for routine progression of care Report consisted of patients Situation, Background, Assessment and  
Recommendations(SBAR). Information from the following report(s) SBAR, ED Summary, [facility] and Recent Results was reviewed with the receiving nurse. Lines:  
Peripheral IV 03/17/20 Left Antecubital (Active) Site Assessment Clean, dry, & intact 3/17/2020 11:40 PM  
Phlebitis Assessment 0 3/17/2020 11:40 PM  
Infiltration Assessment 0 3/17/2020 11:40 PM  
Dressing Status Clean, dry, & intact 3/17/2020 11:40 PM  
Hub Color/Line Status Pink 3/17/2020 11:40 PM  
   
Peripheral IV 03/18/20 Right Antecubital (Active) Site Assessment Clean, dry, & intact 3/18/2020  1:18 AM  
Phlebitis Assessment 0 3/18/2020  1:18 AM  
Infiltration Assessment 0 3/18/2020  1:18 AM  
Dressing Status Clean, dry, & intact 3/18/2020  1:18 AM  
Dressing Type Transparent;Tape 3/18/2020  1:18 AM  
Hub Color/Line Status Pink;Patent; Flushed 3/18/2020  1:18 AM  
Action Taken Blood drawn 3/18/2020  1:18 AM  
  
 
Opportunity for questions and clarification was provided. Patient transported with: 
 Registered Nurse

## 2020-03-18 NOTE — ED NOTES
Pt satting in 62s on RA, 4L O2 placed pt still satting in 70s.   Pt placed on nonrebreather at 15L, Dr. Karan Willson at bedside, pt O2 sat 91%

## 2020-03-18 NOTE — PROGRESS NOTES
Pharmacist Note - Vancomycin and Zosyn Dosing    Consult provided for this 25 y.o. male for indication of pneumonia. Antibiotic regimen(s): unasyn  Patient on vancomycin PTA? YES     Recent Labs     20  2344   WBC 7.8   CREA 1.25   BUN 10     Frequency of BMP: daily  Height: 165 cm  Weight: 64.4 kg  Est CrCl: 79 ml/min  Temp (24hrs), Av.5 °F (36.4 °C), Min:96.9 °F (36.1 °C), Max:98 °F (36.7 °C)  Cullures:pending  Goal trough = 15 - 20 mcg/mL  Therapy will be initiated with a loading dose of 1500 mg IV x 1 to be followed by a maintenance dose of 1250 mg IV every 12 hours. Pharmacy to follow patient daily and order levels / make dose adjustments as appropriate.           Day #1 of zosyn  Indication:  HAP  Current regimen:  3.375gm q6h  Abx regimen: vancomycin,   Recent Labs       Plan: Change to 3.375gm q8h with extended infusion time

## 2020-03-18 NOTE — PROGRESS NOTES
03/18/20 0150   Oxygen Therapy   O2 Sat (%) 94 %   Pulse via Oximetry 88 beats per minute   O2 Device Hi flow nasal cannula; Heated;Humidifier   O2 Flow Rate (L/min) 50 l/min   O2 Temperature 98.6 °F (37 °C)   FIO2 (%) 95 %     HFNC set up in ED due to severe hypoxia. Pt tolerating well. Will monitor & adjust as needed.

## 2020-03-18 NOTE — PROGRESS NOTES
Lovenox dose adjustment  Indication: DVT Prophylaxis  Recent Labs     03/17/20  2344   HGB 14.5      CREA 1.25     Current Weight: 64 kg  Est. CrCl = 79 ml/min  Current Dose: 30 mg subcutaneously every 24 hours.   Plan: Change to 40mg q24h

## 2020-03-18 NOTE — PROGRESS NOTES
Transitions of care  Home with family support    Reason for Admission:   Drug OD                   RUR Score:     10 % low                Plan for utilizing home health:   NA       PCP: First and Last name:  Marissa Kelly   Name of Practice:    Are you a current patient: Yes/No:    Approximate date of last visit: ?? Current Advanced Directive/Advance Care Plan: Not on file, patient is a full code                       Care manager met with patient to introduce self and explain role. Patient is independent. Per patient his cousin King Belen [phone] is his emergency contact. Patient does not drive, friends take him where he needs to go. Confirmed insurance information on demographic sheet. Care management will follow for transitions of care. Radha Mcconnell RN,CRM        Care Management Interventions  PCP Verified by CM: Yes(Dr Dima Calderon)  MyChart Signup: No  Discharge Durable Medical Equipment: No  Physical Therapy Consult: No  Occupational Therapy Consult: No  Speech Therapy Consult: No  Current Support Network: ([facility] [phone])  Confirm Follow Up Transport: Friends.

## 2020-03-18 NOTE — PROGRESS NOTES
0800: Verbal shift change report given to Kaitlyn Thomas RN (oncoming nurse) by Arthur Maynard RN (offgoing nurse). Report included the following information SBAR, Kardex, ED Summary, Intake/Output, MAR, Recent Results and Cardiac Rhythm NSR.     0805: Pt on droplet precautions at bedside for respiratory issues. No COVID sample sent, d/w MD, Nurse Director Caroline Elizondo. Director discussed case with Adolph Vilchis with IP. OK to take pt off droplet+ precautions.

## 2020-03-18 NOTE — PROGRESS NOTES
Interval ICU progress note:    Patient presented after found down post drug overdose. Diagnostics significant for BL pulmonary infiltrates. Patient denies fevers, chills, nausea, vomits, diarrhea, or sick contacts prior to event. Currently afebrile with unremarkable blood counts and chemistries. Procalcitonin low. Suspect drug induced pulmonary edema (narcan induced) and or pneumonitis (either aspiration or drug induced from snorting drugs). No indications of infectious etiologies. Will stop antibiotics, contact precautions, and attempt gentle diuresis. Currently on HFNC maintaining adequate saturations. Will wean as able. General: NAD  HENT: Atraumatic  Cardio: RRR  Respiratory: CTA x 2  GI: Soft not tender abdomen  Extremities: -ve edema  Neuro: grossly intact      Will continue to monitor    CC time 30 minutes    . Anastasia Maya MD

## 2020-03-18 NOTE — H&P
[facility]    ICU Team Consult Note  CONSULTATION REPORT  NAME:  Jessica Moreland  SEX:   male  ADMIT: 3/17/2020  DATE OF CONSULT: 3/18/2020  REFERRING PHYSICIAN: Dr. Cece Harrison  : 1995  MR#    [id number]  ROOM: [facility]  ACCT#  [de-identified]    CONSULTING PHYSICIAN:  Dr. Nino Villasenor:  Respiratory Failure    ASSESSMENT/PLAN:   Acute hypoxic and hypercapnic respiratory failure, multifactorial, initially related to narcotic overdose, complicated by apparent bilateral pneumonia versus pulmonary edema   Continue supplemental O2 as required. Currently on 100% nonrebreather, will place on high flow nasal cannula titrate to maintain SPO2 greater than 90%.  Duo nebs every 6 hours   Will check CTA of the chest.  Suspect bilateral pneumonia, vs pulm edema, likely aspiration component, with risk factors for drug resistance with recent abx use in 2020 treated for Gonorrhea but cannot recall which medication  Will start antibiotics with Vancomycin and zosyn for now pending Culture results  Nebulizers as above  Will check respiratory viral panel, rapid influenza screen, MRSA nares swab, blood culture, and sputum culture. Consider COVID19 testing if negative/further clinically indicated. Follow cultures and adjust antibiotics according to results. proBNP normal  Troponin normal  CT of the chest noted  Follow lactate, check Procalcitonin  Check echo  --Consider bronchoscopy based on clinical course  --Will check LAWRENCE with sibling with reported hx of Lupus, consider additional workup based on results. Accidental opiate drug overdose in the setting of polysubstance abuse, marijuana, alcohol, cocaine, and heroin, reports snorting heroin and cocaine, denies intravenous use, positive response to Narcan  PRN Narcan for respirations less than 8  Drug screen noted   on cessation of positive lifestyle changes as clinical status improves. Will add thiamine and folate.  Observe for possible alcohol withdrawal.  Will hold off on Ativan for now. --Will check HIV screen with high risk behavior and multiple STDs in past, discussed with patient. --Nicotine patch PRN  Hyperglycemia, no known history of diabetes  Will check A1c and follow Accu-Cheks every 6 hours. Consider sliding scale insulin based on results. Pepcid for GI prophylaxis  Lovenox SQ for DVT prophylaxis  CBC, BMP in am.   Explained assessment and plan to the patient and answered questions. Discussed with Dr. Chava Strauss  Discussed with the nurse and RT.  CCT: 60 min      HISTORY OF PRESENT ILLNESS:  Gigi Benavides is a 25 y.o. BLACK OR  male history of polysubstance abuse who was admitted on 3/17/2020. He presented to the ED after being found down unresponsive, EMS responded, pupils were pinpoint and the patient's respirations were decreased around 6. He was given Narcan with positive response and became much more awake. However, the patient remained hypoxic requiring significant supplemental oxygen support. In the ED, vital signs were noted remarkable for slightly elevated heart rate of 102 and decreased SPO2 of 62. The patient was placed on 100% nonrebreather and ABG revealed pH of 7.33, PCO2 45, PaO2 58, bicarb 24. Chemistry was significant for hyperglycemia with blood glucose 248. Lactate was slightly elevated at 3.1. Troponin and BNP were normal.  CBC was unremarkable. The patient endorsed inhalation of heroin earlier in the evening and daily marijuana use. Of note, the patient also reported cold-like symptoms over the last week with increased cough and sputum described as tan-yellow in color. Patient also endorsed chest pain with deep breath and cough. The patient self medicated with over-the-counter cold medications and did not is any healthcare providers or take antibiotics for recent respiratory illness.   Patient did also report that both sister and brother have recently experienced cold and flulike symptoms but have not been evaluated by healthcare provider or taking antibiotics to his knowledge. Patient denies any travel or contact with anyone with known COVID 19 exposure. PAST MEDICAL HISTORY:  History reviewed. No pertinent past medical history. Carroll Tabares PAST SURGICAL HISTORY:  History reviewed. No pertinent surgical history. SOCIAL HISTORY:  Tobacco use 1 PPD x7 years. Daily marijuana use. Alcohol use defined as approximately 5 days a week average intake of 3 beers, but sometimes significantly more. FAMILY HISTORY:  Significant for lupus in sister. ALLERGIES:  No Known Allergies    HOME MEDICATIONS:  Prior to Admission Medications   Prescriptions Last Dose Informant Patient Reported? Taking? cyclobenzaprine (FLEXERIL) 10 mg tablet   No No   Sig: Take 1 Tab by mouth three (3) times daily as needed for Muscle Spasm(s).       Facility-Administered Medications: None     CURRENT MEDICATIONS    Current Facility-Administered Medications:     naloxone (NARCAN) injection 0.4 mg, 0.4 mg, IntraVENous, PRN, Alyssa Race, ACNP    sodium chloride 0.9 % bolus infusion 100 mL, 100 mL, IntraVENous, RAD ONCE, Shea Michele MD    sodium chloride (NS) flush 10 mL, 10 mL, IntraVENous, RAD ONCE, Shea Michele MD    iopamidoL (ISOVUE-370) 76 % injection 100 mL, 100 mL, IntraVENous, RAD ONCE, Aurea Mandujano MD    albuterol-ipratropium (DUO-NEB) 2.5 MG-0.5 MG/3 ML, 3 mL, Nebulization, Q6H RT, Alyssa Race, ACNP    ondansetron [facility]) injection 4 mg, 4 mg, IntraVENous, Q4H PRN, Alyssa Race, ACNP    ampicillin-sulbactam (UNASYN) 3 g in 0.9% sodium chloride (MBP/ADV) 100 mL, 3 g, IntraVENous, Q6H, Alyssa Race, ACNP    sodium chloride (NS) flush 5-40 mL, 5-40 mL, IntraVENous, Q8H, Alyssa Race, ACNP    sodium chloride (NS) flush 5-40 mL, 5-40 mL, IntraVENous, PRN, Alyssa Race, ACNP    famotidine (PF) (PEPCID) 20 mg in 0.9% sodium chloride 10 mL injection, 20 mg, IntraVENous, Q12H, Jen Light, ACNP    enoxaparin (LOVENOX) injection 30 mg, 30 mg, SubCUTAneous, Q24H, Jen Light, ACNP    Current Outpatient Medications:     cyclobenzaprine (FLEXERIL) 10 mg tablet, Take 1 Tab by mouth three (3) times daily as needed for Muscle Spasm(s). , Disp: 12 Tab, Rfl: 0  REVIEW OF SYSTEMS:    Constitutional: No fevers, chills, or fatigue. Psychological: No anxiety or depression   HEENT: No headaches, changes in vision or hearing, congestion, or discharge  Hematological: No easy bruising or bleeding  Endocrine: No increased flushing/sweating, hot/cold intolerance, or increased thirst  Respiratory: Positive for cough with tan yellow sputum and SOB. No wheezing or chest tightness  Cardiovascular: Positive for burning central chest discomfort with deep breath/cough. No palpitation, orthopnea, on leg swelling   Gastrointestinal: No abdominal pain, Nausea, vomiting, or diarrhea  Genitourinary: No hematuria, difficulty urinating, or flank pain  Musculoskeletal: No new joint pain, stiffness, or swelling  Neurological: No dizziness, weakness, tremor, numbness, or siezures      PHYSICAL EXAMINATION:  Visit Vitals  /89   Pulse 87   Temp 98 °F (36.7 °C)   Resp 20   Ht 5' 5\" (1.651 m)   Wt 64.4 kg (141 lb 15.6 oz)   SpO2 91%   BMI 23.63 kg/m²      Temp (24hrs), Av °F (36.7 °C), Min:98 °F (36.7 °C), Max:98 °F (36.7 °C)    GENERAL: Young adult Male, lying in bed, no acute distress  HEENT:Normocephalic, atraumatic, vision and hearing grossly intact, PERRL, EOMI  NECK:Supple, nontender, trachea midline  RESPIRATORY: Symmetrical expansion. Bilateral coarse rhonchi.  No Wheezing  CARDIOVASCULAR: RRR, No Murmur, rubs, or gallor, distal pulses intact, no edema  GI: Abdomen soft, nontender, nondistended, BS active  EXTREMITIES: ROM normal, Strength symetrical, no deformity  SKIN: Intact, no rash, no lesions  NEURO: CNs grossly intact, No focal deficit  PSYCH: Drowsy, A&O x 3 when aroused, Appropriate    LABORATORY DATA:  Personally reviewed  Recent Labs     03/17/20  2344   WBC 7.8   HGB 14.5   HCT 44.2        Recent Labs     03/17/20  2344      K 3.4*      CO2 25   BUN 10   CREA 1.25   *   CA 8.2*     Recent Labs     03/17/20  2344   SGOT 22   AP 67   TP 6.8   ALB 3.3*   GLOB 3.5     No results for input(s): INR, PTP, APTT, INREXT in the last 72 hours. Recent Labs     03/18/20  0144   PHI 7.334*   PCO2I 45.5*   PO2I 58*   FIO2I 100     Recent Labs     03/18/20  0114   TROIQ <0.05        Xr Chest Port    Result Date: 3/18/2020  PORTABLE CHEST RADIOGRAPH/S: 3/18/2020 12:31 AM INDICATION: Hypoxia, drug overdose. COMPARISON: None. TECHNIQUE: Portable frontal upright radiograph/s of the chest. FINDINGS: Perihilar airspace disease, right greater than left, likely represents edema as opposed to pneumonia. The central airways are patent. No pneumothorax or pleural effusion. IMPRESSION: Perihilar, alveolar, pulmonary edema.         VALERIE Molina  March 18, 2020  2:27 AM

## 2020-03-18 NOTE — ED TRIAGE NOTES
Pt arrives ambulatory via EMS. Pt found unresponsive outside of an apartment complex, pinpoint pupils on EMS arrival and breathing shallow. Pt more alert post narcan administration, pt currently on 4L NC    0.75 narcan administered IV, BGL was 409.

## 2020-03-19 ENCOUNTER — APPOINTMENT (OUTPATIENT)
Dept: GENERAL RADIOLOGY | Age: 25
DRG: 816 | End: 2020-03-19
Payer: COMMERCIAL

## 2020-03-19 LAB
ANA TITR SER IF: NEGATIVE {TITER}
ANION GAP SERPL CALC-SCNC: 6 MMOL/L (ref 5–15)
ANION GAP SERPL CALC-SCNC: 7 MMOL/L (ref 5–15)
BACTERIA SPEC CULT: NORMAL
BACTERIA SPEC CULT: NORMAL
BASOPHILS # BLD: 0 K/UL (ref 0–0.1)
BASOPHILS NFR BLD: 0 % (ref 0–1)
BUN SERPL-MCNC: 7 MG/DL (ref 6–20)
BUN SERPL-MCNC: 9 MG/DL (ref 6–20)
BUN/CREAT SERPL: 11 (ref 12–20)
BUN/CREAT SERPL: 8 (ref 12–20)
CALCIUM SERPL-MCNC: 8.5 MG/DL (ref 8.5–10.1)
CALCIUM SERPL-MCNC: 9.1 MG/DL (ref 8.5–10.1)
CHLORIDE SERPL-SCNC: 100 MMOL/L (ref 97–108)
CHLORIDE SERPL-SCNC: 102 MMOL/L (ref 97–108)
CO2 SERPL-SCNC: 27 MMOL/L (ref 21–32)
CO2 SERPL-SCNC: 27 MMOL/L (ref 21–32)
CREAT SERPL-MCNC: 0.83 MG/DL (ref 0.7–1.3)
CREAT SERPL-MCNC: 0.9 MG/DL (ref 0.7–1.3)
DIFFERENTIAL METHOD BLD: ABNORMAL
EOSINOPHIL # BLD: 0.1 K/UL (ref 0–0.4)
EOSINOPHIL NFR BLD: 1 % (ref 0–7)
ERYTHROCYTE [DISTWIDTH] IN BLOOD BY AUTOMATED COUNT: 12.9 % (ref 11.5–14.5)
GLUCOSE SERPL-MCNC: 101 MG/DL (ref 65–100)
GLUCOSE SERPL-MCNC: 94 MG/DL (ref 65–100)
HCT VFR BLD AUTO: 42.7 % (ref 36.6–50.3)
HGB BLD-MCNC: 14.6 G/DL (ref 12.1–17)
IMM GRANULOCYTES # BLD AUTO: 0.1 K/UL (ref 0–0.04)
IMM GRANULOCYTES NFR BLD AUTO: 1 % (ref 0–0.5)
LYMPHOCYTES # BLD: 3.3 K/UL (ref 0.8–3.5)
LYMPHOCYTES NFR BLD: 19 % (ref 12–49)
MCH RBC QN AUTO: 31.4 PG (ref 26–34)
MCHC RBC AUTO-ENTMCNC: 34.2 G/DL (ref 30–36.5)
MCV RBC AUTO: 91.8 FL (ref 80–99)
MONOCYTES # BLD: 0.9 K/UL (ref 0–1)
MONOCYTES NFR BLD: 5 % (ref 5–13)
NEUTS SEG # BLD: 13.3 K/UL (ref 1.8–8)
NEUTS SEG NFR BLD: 74 % (ref 32–75)
NRBC # BLD: 0 K/UL (ref 0–0.01)
NRBC BLD-RTO: 0 PER 100 WBC
PLATELET # BLD AUTO: 230 K/UL (ref 150–400)
PMV BLD AUTO: 9.7 FL (ref 8.9–12.9)
POTASSIUM SERPL-SCNC: 3.6 MMOL/L (ref 3.5–5.1)
POTASSIUM SERPL-SCNC: 3.8 MMOL/L (ref 3.5–5.1)
RBC # BLD AUTO: 4.65 M/UL (ref 4.1–5.7)
SERVICE CMNT-IMP: NORMAL
SODIUM SERPL-SCNC: 133 MMOL/L (ref 136–145)
SODIUM SERPL-SCNC: 136 MMOL/L (ref 136–145)
WBC # BLD AUTO: 17.7 K/UL (ref 4.1–11.1)

## 2020-03-19 PROCEDURE — 74011250636 HC RX REV CODE- 250/636: Performed by: NURSE PRACTITIONER

## 2020-03-19 PROCEDURE — 74011000258 HC RX REV CODE- 258: Performed by: FAMILY MEDICINE

## 2020-03-19 PROCEDURE — 94640 AIRWAY INHALATION TREATMENT: CPT

## 2020-03-19 PROCEDURE — 74011250637 HC RX REV CODE- 250/637: Performed by: INTERNAL MEDICINE

## 2020-03-19 PROCEDURE — 77010033678 HC OXYGEN DAILY

## 2020-03-19 PROCEDURE — 74011250637 HC RX REV CODE- 250/637: Performed by: NURSE PRACTITIONER

## 2020-03-19 PROCEDURE — 74011250636 HC RX REV CODE- 250/636: Performed by: INTERNAL MEDICINE

## 2020-03-19 PROCEDURE — 87070 CULTURE OTHR SPECIMN AEROBIC: CPT

## 2020-03-19 PROCEDURE — 74011000258 HC RX REV CODE- 258: Performed by: NURSE PRACTITIONER

## 2020-03-19 PROCEDURE — 77030029684 HC NEB SM VOL KT MONA -A

## 2020-03-19 PROCEDURE — 80048 BASIC METABOLIC PNL TOTAL CA: CPT

## 2020-03-19 PROCEDURE — 94664 DEMO&/EVAL PT USE INHALER: CPT

## 2020-03-19 PROCEDURE — 71045 X-RAY EXAM CHEST 1 VIEW: CPT

## 2020-03-19 PROCEDURE — 74011000250 HC RX REV CODE- 250: Performed by: NURSE PRACTITIONER

## 2020-03-19 PROCEDURE — 65660000001 HC RM ICU INTERMED STEPDOWN

## 2020-03-19 PROCEDURE — 36415 COLL VENOUS BLD VENIPUNCTURE: CPT

## 2020-03-19 PROCEDURE — 85025 COMPLETE CBC W/AUTO DIFF WBC: CPT

## 2020-03-19 PROCEDURE — 74011250636 HC RX REV CODE- 250/636: Performed by: FAMILY MEDICINE

## 2020-03-19 RX ORDER — IPRATROPIUM BROMIDE AND ALBUTEROL SULFATE 2.5; .5 MG/3ML; MG/3ML
3 SOLUTION RESPIRATORY (INHALATION)
Status: DISCONTINUED | OUTPATIENT
Start: 2020-03-20 | End: 2020-03-20 | Stop reason: HOSPADM

## 2020-03-19 RX ORDER — FUROSEMIDE 10 MG/ML
20 INJECTION INTRAMUSCULAR; INTRAVENOUS 2 TIMES DAILY
Status: DISCONTINUED | OUTPATIENT
Start: 2020-03-19 | End: 2020-03-19

## 2020-03-19 RX ORDER — FUROSEMIDE 10 MG/ML
40 INJECTION INTRAMUSCULAR; INTRAVENOUS DAILY
Status: DISCONTINUED | OUTPATIENT
Start: 2020-03-19 | End: 2020-03-20 | Stop reason: HOSPADM

## 2020-03-19 RX ORDER — THERA TABS 400 MCG
1 TAB ORAL DAILY
Status: DISCONTINUED | OUTPATIENT
Start: 2020-03-20 | End: 2020-03-20 | Stop reason: HOSPADM

## 2020-03-19 RX ORDER — AMOXICILLIN AND CLAVULANATE POTASSIUM 875; 125 MG/1; MG/1
1 TABLET, FILM COATED ORAL EVERY 12 HOURS
Status: DISCONTINUED | OUTPATIENT
Start: 2020-03-19 | End: 2020-03-19

## 2020-03-19 RX ADMIN — FUROSEMIDE 40 MG: 10 INJECTION, SOLUTION INTRAMUSCULAR; INTRAVENOUS at 17:08

## 2020-03-19 RX ADMIN — THIAMINE HYDROCHLORIDE 100 MG: 100 INJECTION, SOLUTION INTRAMUSCULAR; INTRAVENOUS at 10:03

## 2020-03-19 RX ADMIN — AMOXICILLIN AND CLAVULANATE POTASSIUM 1 TABLET: 875; 125 TABLET, FILM COATED ORAL at 15:19

## 2020-03-19 RX ADMIN — IPRATROPIUM BROMIDE AND ALBUTEROL SULFATE 3 ML: .5; 3 SOLUTION RESPIRATORY (INHALATION) at 07:18

## 2020-03-19 RX ADMIN — FOLIC ACID 1 MG: 1 TABLET ORAL at 08:28

## 2020-03-19 RX ADMIN — FUROSEMIDE 20 MG: 10 INJECTION, SOLUTION INTRAMUSCULAR; INTRAVENOUS at 08:27

## 2020-03-19 RX ADMIN — IPRATROPIUM BROMIDE AND ALBUTEROL SULFATE 3 ML: .5; 3 SOLUTION RESPIRATORY (INHALATION) at 15:19

## 2020-03-19 RX ADMIN — ENOXAPARIN SODIUM 40 MG: 40 INJECTION SUBCUTANEOUS at 03:36

## 2020-03-19 RX ADMIN — Medication 10 ML: at 15:19

## 2020-03-19 RX ADMIN — Medication 10 ML: at 21:32

## 2020-03-19 RX ADMIN — Medication 10 ML: at 06:59

## 2020-03-19 RX ADMIN — POTASSIUM BICARBONATE 40 MEQ: 782 TABLET, EFFERVESCENT ORAL at 08:28

## 2020-03-19 RX ADMIN — IPRATROPIUM BROMIDE AND ALBUTEROL SULFATE 3 ML: .5; 3 SOLUTION RESPIRATORY (INHALATION) at 22:34

## 2020-03-19 RX ADMIN — PIPERACILLIN AND TAZOBACTAM 3.38 G: 3; .375 INJECTION, POWDER, LYOPHILIZED, FOR SOLUTION INTRAVENOUS at 17:08

## 2020-03-19 NOTE — PROGRESS NOTES
1930: Bedside and Verbal shift change report given to [address] [place], [place] [id number] (oncoming nurse) by Julius Pinon (offgoing nurse). Report included the following information SBAR, Kardex, Intake/Output, MAR, Recent Results, Cardiac Rhythm NSR and Alarm Parameters . 0730: Bedside and Verbal shift change report given to [address] [place] [address] 107 (oncoming nurse) by Juana Brittle RN (offgoing nurse). Report included the following information SBAR, Kardex, ED Summary, Intake/Output, MAR, Recent Results, Cardiac Rhythm NSR and Alarm Parameters .

## 2020-03-19 NOTE — PROGRESS NOTES
[facility] Adult  Hospitalist Group     ICU Transfer/Accept Summary     [place] [address] [place]  DATE OF TRANSFER: 3/19/2020       PATIENT ID: Wesley Quesada  MRN: [id number]   YOB: 1995    PRIMARY CARE PROVIDER: Rebecca Summers MD   DATE OF ADMISSION: 3/17/2020 11:34 PM    ATTENDING PHYSICIAN: Steve Baeza MD  CONSULTATIONS:   None    PROCEDURES/SURGERIES:   * No surgery found *    REASON FOR ADMISSION: <principal problem not specified>     HOSPITAL PROBLEM LIST:  Patient Active Problem List   Diagnosis Code    Pneumonia J18.9         Brief HPI and Hospital Course:      Patient is a 31-year-old -American male who presents to the emergency room by EMS after he was found unresponsive with pinpoint pupils. Also with initial [place] rate of 6. Patient was given Narcan and became more awake. On further questioning it was noted that patient has used heroin prior to the event. Patient was initially admitted to ICU and was requiring 100% nonrebreather. Now is weaned to high flow oxygen. Further work-up with CT of the chest shows multilobar, dense airspace consolidation, edema slightly favored over pneumonia per report. Was started on antibiotics. Patient is more alert and stable today and will be downgraded to [facility]. Assessment and Plan:    Acute respiratory failure  Acute hypoxic and hypercapnic respiratory failure secondary to heroin overdose and complicated by pneumonia versus pulmonary edema. Currently requiring high flow oxygen. Pneumonia  Suspected aspiration pneumonia, white count is elevated today. Empirically started on Zosyn. Also blood cultures. Aspiratory panel was negative. Pulmonary edema  BMP and troponin are normal limits. Check echocardiogram for further evaluation. Trial of daily Lasix. Polysubstance abuse  Urine drug screen positive for cocaine and THC. Counseling done. HIV testing was done and negative.     Alcohol abuse  Monitor for withdrawal.  Thiamine, folic acid and multivitamin. Tobacco use  Nicotine replacement. PHYSICAL EXAMINATION:  Visit Vitals  /75   Pulse 68   Temp 98.5 °F (36.9 °C)   Resp 24   Ht 5' 5\" (1.651 m)   Wt 64.4 kg (141 lb 15.6 oz)   SpO2 97%   BMI 23.63 kg/m²       General:          Alert, cooperative, no distress  HEENT:           Atraumatic, MMM            Neck:               Supple, symmetrical,  thyroid: non tender  Lungs:             Clear to auscultation bilaterally. No Wheezing or Rhonchi. No rales. Heart:              Regular  rhythm,  No  murmur   No edema  Abdomen:       Soft, non-tender. Not distended. Bowel sounds normal  Extremities:     No cyanosis. No clubbing,  +2 distal pulses  Skin:                Not pale. Not Jaundiced  No rashes   Psych:             Not anxious or agitated.   Neurologic:      Alert, moves all extremities, oriented X 3.     CODE STATUS:   Full Code    DNR    Partial    Comfort Care     Signed:   Osvaldo Mason MD  Date of Service:  3/19/2020  3:31 PM

## 2020-03-19 NOTE — H&P
[facility]    ICU Team Progress Note  NAME:  Alex Ricardo  SEX:   male  ADMIT: 3/17/2020  DATE OF CONSULT: 3/19/2020  : 1995  MR#    [id number]  ROOM: [address] [place], [place] [address]T#  [de-identified]    REASON FOR ICU Admission:  Respiratory Failure    HISTORY OF PRESENT ILLNESS:  Alex Ricardo is a 25 y.o. male with history of polysubstance abuse who was admitted on 3/17/2020. He was found unresponsive after drug overdose. While patient was responsive after narcan administration he then developed hypoxic respiratory failure. CT chest was remarkable for bilateral pulmonary infiltrates suggestive of PNA vs volume. Patient denies any travel or contact with anyone with known COVID 19 exposure. Overnight patient was diuresed and oxygen requirements improved some. He refers to feel better. ASSESSMENT/PLAN:   Acute hypoxic and hypercapnic respiratory failure,    Suspect secondary to volume overload from narcan and possible pneumonitis/aspiration PNA from drug use and aspiration. Continue supplemental O2 and wean as able. Continues to require HFNC - given increase in leukocyte count will start Augmentin for aspiration. Duo nebs every 6 hours as needed    Drug abuse - Counseled on abstinence. Transfer to [facility]    Lovenox SQ for DVT prophylaxis  Explained assessment and plan to the patient and answered questions. Discussed with the nurse and RT.  CCT: 30 min    PAST MEDICAL HISTORY:  History reviewed. No pertinent past medical history. Fredo Moran PAST SURGICAL HISTORY:  History reviewed. No pertinent surgical history. SOCIAL HISTORY:  Tobacco use 1 PPD x7 years. Daily marijuana use. Alcohol use defined as approximately 5 days a week average intake of 3 beers, but sometimes significantly more. FAMILY HISTORY:  Significant for lupus in sister. ALLERGIES:  No Known Allergies    HOME MEDICATIONS:  Prior to Admission Medications   Prescriptions Last Dose Informant Patient Reported? Taking? cyclobenzaprine (FLEXERIL) 10 mg tablet   No No   Sig: Take 1 Tab by mouth three (3) times daily as needed for Muscle Spasm(s).       Facility-Administered Medications: None     CURRENT MEDICATIONS    Current Facility-Administered Medications:     amoxicillin-clavulanate (AUGMENTIN) 875-125 mg per tablet 1 Tab, 1 Tab, Oral, Q12H, Lukas Calderon MD    naloxone [facility]) injection 0.4 mg, 0.4 mg, IntraVENous, PRN, Madison Carlos, ACNP, 0.4 mg at 20 0329    albuterol-ipratropium (DUO-NEB) 2.5 MG-0.5 MG/3 ML, 3 mL, Nebulization, Q6H RT, Madison Carlos, ACNP, 3 mL at 20 0718    ondansetron (ZOFRAN) injection 4 mg, 4 mg, IntraVENous, Q4H PRN, Madison Carlos, ACNP, 4 mg at 20 1120    sodium chloride (NS) flush 5-40 mL, 5-40 mL, IntraVENous, Q8H, Madison Carlos, ACNP, 10 mL at 20 0659    sodium chloride (NS) flush 5-40 mL, 5-40 mL, IntraVENous, PRN, Madison Carlos, ACNP    enoxaparin (LOVENOX) injection 40 mg, 40 mg, SubCUTAneous, Q24H, Abebe Valerie R, ACNP, 40 mg at 20 5281    thiamine (B-1) 100 mg in 0.9% sodium chloride 50 mL IVPB, 100 mg, IntraVENous, DAILY, Madison Carlos, ACNP, 100 mg at  7604    folic acid (FOLVITE) tablet 1 mg, 1 mg, Oral, DAILY, Madison Carlos, ACNP, 1 mg at 20 5644    nicotine (NICODERM CQ) 21 mg/24 hr patch 1 Patch, 1 Patch, TransDERmal, DAILY PRN, Madison Carlos, ACNP  REVIEW OF SYSTEMS:    Negative unless stated in the HPI    PHYSICAL EXAMINATION:  Visit Vitals  /78 (BP 1 Location: Right arm, BP Patient Position: At rest)   Pulse 86   Temp 98.5 °F (36.9 °C)   Resp 19   Ht 5' 5\" (1.651 m)   Wt 64.4 kg (141 lb 15.6 oz)   SpO2 96%   BMI 23.63 kg/m²      Temp (24hrs), Av.6 °F (37 °C), Min:97.7 °F (36.5 °C), Max:99.6 °F (37.6 °C)    GENERAL: NAD  HEENT: PERRL  RESPIRATORY: Coarse breath sounds BL  CARDIOVASCULAR: RRR  GI: Abdomen soft, nontender, nondistended, BS active  NEURO: CNs grossly intact, No focal deficit  PSYCH: A&O x 3    LABORATORY DATA:  Personally reviewed  Recent Labs     03/19/20  0341 03/17/20  2344   WBC 17.7* 7.8   HGB 14.6 14.5   HCT 42.7 44.2    238     Recent Labs     03/19/20  0341 03/18/20  1919    135*   K 3.6 3.5    101   CO2 27 28   BUN 7 7   CREA 0.90 0.94   * 95   CA 8.5 8.5     Recent Labs     03/17/20  2344   SGOT 22  22   AP 62  67   TP 7.0  6.8   ALB 3.4*  3.3*   GLOB 3.6  3.5     Recent Labs     03/17/20  2344   INR 1.1   PTP 11.5*   APTT 23.6      Recent Labs     03/18/20  0144   PHI 7.334*   PCO2I 45.5*   PO2I 58*   FIO2I 100     Recent Labs     03/18/20  0114   TROIQ <0.05        Xr Chest Port    Result Date: 3/19/2020  PORTABLE CHEST RADIOGRAPH/S: 3/19/2020 5:12 AM INDICATION: Dyspnea. COMPARISON: 3/18/2020; CT chest 3/18/2020. TECHNIQUE: Portable frontal semiupright radiograph/s of the chest. FINDINGS: Alveolar pulmonary edema has increased. The central airways are patent. No pneumothorax or pleural effusion. IMPRESSION: Increased alveolar edema.         Salome Mckenzie MD  March 19, 2020

## 2020-03-19 NOTE — PROGRESS NOTES
0730: Bedside shift change report given to Michael Tucker (oncoming nurse) by Michele Jain (offgoing nurse). Report included the following information SBAR, Kardex, Intake/Output and MAR.     1100: ICU multidisciplinary rounds lead by Kvng (Intensivist): The following were reviewed and discussed: current labs,  recent imaging, lines/drains, review of body systems, nutrition, cultures, mobility, length of ICU stay.  The plan of care for the day is as follows  Improve oxygenation(written note by RN)       1200: Primary Nurse Chaparro Mix and Carlene Bah RN, RN performed a dual skin assessment on this patient No impairment noted  Jitendra score is 20

## 2020-03-20 ENCOUNTER — APPOINTMENT (OUTPATIENT)
Dept: NON INVASIVE DIAGNOSTICS | Age: 25
DRG: 816 | End: 2020-03-20
Attending: FAMILY MEDICINE
Payer: COMMERCIAL

## 2020-03-20 VITALS
HEIGHT: 65 IN | WEIGHT: 124.56 LBS | DIASTOLIC BLOOD PRESSURE: 65 MMHG | TEMPERATURE: 98.1 F | HEART RATE: 94 BPM | OXYGEN SATURATION: 95 % | BODY MASS INDEX: 20.75 KG/M2 | RESPIRATION RATE: 18 BRPM | SYSTOLIC BLOOD PRESSURE: 104 MMHG

## 2020-03-20 LAB
ANION GAP SERPL CALC-SCNC: 7 MMOL/L (ref 5–15)
BUN SERPL-MCNC: 9 MG/DL (ref 6–20)
BUN/CREAT SERPL: 10 (ref 12–20)
CALCIUM SERPL-MCNC: 9.3 MG/DL (ref 8.5–10.1)
CHLORIDE SERPL-SCNC: 100 MMOL/L (ref 97–108)
CO2 SERPL-SCNC: 26 MMOL/L (ref 21–32)
CREAT SERPL-MCNC: 0.94 MG/DL (ref 0.7–1.3)
GLUCOSE SERPL-MCNC: 107 MG/DL (ref 65–100)
POTASSIUM SERPL-SCNC: 3.6 MMOL/L (ref 3.5–5.1)
SODIUM SERPL-SCNC: 133 MMOL/L (ref 136–145)

## 2020-03-20 PROCEDURE — 80048 BASIC METABOLIC PNL TOTAL CA: CPT

## 2020-03-20 PROCEDURE — 36415 COLL VENOUS BLD VENIPUNCTURE: CPT

## 2020-03-20 PROCEDURE — 90686 IIV4 VACC NO PRSV 0.5 ML IM: CPT | Performed by: FAMILY MEDICINE

## 2020-03-20 PROCEDURE — 74011250636 HC RX REV CODE- 250/636: Performed by: FAMILY MEDICINE

## 2020-03-20 PROCEDURE — 74011000250 HC RX REV CODE- 250: Performed by: FAMILY MEDICINE

## 2020-03-20 PROCEDURE — 94640 AIRWAY INHALATION TREATMENT: CPT

## 2020-03-20 PROCEDURE — 74011250637 HC RX REV CODE- 250/637: Performed by: NURSE PRACTITIONER

## 2020-03-20 PROCEDURE — 74011250636 HC RX REV CODE- 250/636: Performed by: NURSE PRACTITIONER

## 2020-03-20 PROCEDURE — 90471 IMMUNIZATION ADMIN: CPT

## 2020-03-20 PROCEDURE — 74011000258 HC RX REV CODE- 258: Performed by: FAMILY MEDICINE

## 2020-03-20 PROCEDURE — 74011250637 HC RX REV CODE- 250/637: Performed by: FAMILY MEDICINE

## 2020-03-20 PROCEDURE — 93306 TTE W/DOPPLER COMPLETE: CPT

## 2020-03-20 RX ORDER — IBUPROFEN 200 MG
1 TABLET ORAL EVERY 24 HOURS
Qty: 30 PATCH | Refills: 0 | Status: SHIPPED | OUTPATIENT
Start: 2020-03-20 | End: 2020-04-19

## 2020-03-20 RX ORDER — AMOXICILLIN AND CLAVULANATE POTASSIUM 875; 125 MG/1; MG/1
1 TABLET, FILM COATED ORAL EVERY 12 HOURS
Qty: 10 TAB | Refills: 0 | Status: SHIPPED | OUTPATIENT
Start: 2020-03-20 | End: 2020-03-25

## 2020-03-20 RX ORDER — LANOLIN ALCOHOL/MO/W.PET/CERES
100 CREAM (GRAM) TOPICAL DAILY
Status: DISCONTINUED | OUTPATIENT
Start: 2020-03-20 | End: 2020-03-20 | Stop reason: HOSPADM

## 2020-03-20 RX ADMIN — THERA TABS 1 TABLET: TAB at 08:10

## 2020-03-20 RX ADMIN — PIPERACILLIN AND TAZOBACTAM 3.38 G: 3; .375 INJECTION, POWDER, LYOPHILIZED, FOR SOLUTION INTRAVENOUS at 08:10

## 2020-03-20 RX ADMIN — FOLIC ACID 1 MG: 1 TABLET ORAL at 08:10

## 2020-03-20 RX ADMIN — ENOXAPARIN SODIUM 40 MG: 40 INJECTION SUBCUTANEOUS at 03:30

## 2020-03-20 RX ADMIN — FUROSEMIDE 40 MG: 10 INJECTION, SOLUTION INTRAMUSCULAR; INTRAVENOUS at 08:10

## 2020-03-20 RX ADMIN — PIPERACILLIN AND TAZOBACTAM 3.38 G: 3; .375 INJECTION, POWDER, LYOPHILIZED, FOR SOLUTION INTRAVENOUS at 00:12

## 2020-03-20 RX ADMIN — Medication 10 ML: at 07:00

## 2020-03-20 RX ADMIN — IPRATROPIUM BROMIDE AND ALBUTEROL SULFATE 3 ML: .5; 3 SOLUTION RESPIRATORY (INHALATION) at 07:57

## 2020-03-20 RX ADMIN — INFLUENZA VIRUS VACCINE 0.5 ML: 15; 15; 15; 15 SUSPENSION INTRAMUSCULAR at 15:57

## 2020-03-20 RX ADMIN — Medication 100 MG: at 11:36

## 2020-03-20 NOTE — PROGRESS NOTES
0800: Report received. Patient in no apparent distress. RT notified of chest PT orders. 1030: Contacted Dr. Brandon Isaac about converting IV thiamine to PO.    1140: Arrived to room to find patient getting cleaned up. Patient on room air as he had taken nasal cannula off to bathe. Patient SPO2 95% on room air. Will continue to monitor. 1615: I have reviewed discharge instructions with the patient. The patient verbalized understanding. Problem: Pneumonia: Day 3  Goal: Activity/Safety  Outcome: Progressing Towards Goal  Goal: Medications  Outcome: Progressing Towards Goal  Goal: Respiratory  Outcome: Progressing Towards Goal  Note: Plan to wean O2 as tolerated.

## 2020-03-20 NOTE — PROGRESS NOTES
Problem: Falls - Risk of  Goal: *Absence of Falls  Description: Document Alicia Stafford Fall Risk and appropriate interventions in the flowsheet. 3/20/2020 0813 by Sherman LAROSE  Note: Fall Risk Interventions:Bed is in the lowest position and wheels are locked, call bell is within reach, bathroom light is on during evening hours, gripper socks are on and patient has been instructed to call out for assistance if needed. As of now, patient is free from falls and will continue to be monitored. 3/20/2020 0811 by Miguel Cox  Outcome: Progressing Towards Goal  Note: Fall Risk Interventions:  Bed is in the lowest position and wheels are locked, call bell is within reach, bathroom light is on during evening hours, gripper socks are on and patient has been instructed to call out for assistance if needed. As of now, patient is free from falls and will continue to be monitored. Problem: Chronic Obstructive Pulmonary Disease (COPD)  Goal: *Absence of hypoxia  Outcome: Progressing Towards Goal  Note: Patient is on 30L highflow and his O2 saturations are remaining above 90%       Bedside shift change report given to SERENITY Lyons (oncoming nurse) by Murtaza Cabello RN (offgoing nurse). Report included the following information SBAR, Kardex, Intake/Output, MAR, Accordion, Recent Results and Cardiac Rhythm Sinus Adria/NSR.

## 2020-03-20 NOTE — DISCHARGE SUMMARY
Discharge Summary       PATIENT ID: Holly Pace  MRN: [id number]   YOB: 1995    DATE OF ADMISSION: 3/17/2020 11:34 PM    DATE OF DISCHARGE: 3/20/2020   PRIMARY CARE PROVIDER: Cecily Berry MD     ATTENDING PHYSICIAN: Grace Aquino  DISCHARGING PROVIDER: Shaila Kay MD    To contact this individual call [phone] and ask the  to page. If unavailable ask to be transferred the Adult Hospitalist Department. CONSULTATIONS: IP CONSULT TO PULMONOLOGY    PROCEDURES/SURGERIES: * No surgery found *    ADMITTING DIAGNOSES & HOSPITAL COURSE:     HPI  Patient is a 59-year-old -American male who presents to the emergency room by EMS after he was found unresponsive with pinpoint pupils. Jordan Founds with initial Pittore rate of 6.  Patient was given Narcan and became more awake.  On further questioning it was noted that patient has used heroin prior to the event.  Patient was initially admitted to ICU and was requiring 100% nonrebreather.  Now is weaned to high flow oxygen.  Further work-up with CT of the chest shows multilobar, dense airspace consolidation, edema slightly favored over pneumonia per report.  Was started on antibiotics.  Patient is more alert and stable and therefore downgraded to [facility]. Hospital course  1. Acute respiratory failure  Secondary to aspiration pneumonia. May have component of pulmonary edema. Initially requiring high flow oxygen but oxygen was quickly weaned off to room air with stable respiratory status. Pulmonology evaluated the patient and okay with discharge to home from their standpoint. Pulmonology note pending at the time of discharge. 2.  Pneumonia  Aspiration pneumonia secondary to decreased responsiveness from heroin overdose. Patient treated with IV Zosyn, will be discharged home on p.o. Augmentin to complete a total of 7-day course. 3.  Polysubstance abuse  Urine drug screen positive for cocaine and THC.   Patient was given information on resources for drug and alcohol rehab program.    4.  Alcohol abuse  Patient was monitored for withdrawal and no signs and symptoms of withdrawal noted. 5.  Tobacco use  Patient was prescribed nicotine patch. DISCHARGE DIAGNOSES / PLAN:      1. Acute respiratory failure  2. Aspiration pneumonia  3. Pulmonary edema  4. Polysubstance abuse  5. Alcohol abuse  6. Tobacco use     ADDITIONAL CARE RECOMMENDATIONS:     None     PENDING TEST RESULTS:   At the time of discharge the following test results are still pending: None    FOLLOW UP APPOINTMENTS:    Follow-up Information     Follow up With Specialties Details Why Contact Info    Goldie Daniel MD Internal Medicine   [address]  [phone]               DIET: Regular Diet  Oral Nutritional Supplements: No Oral Supplement prescribed    ACTIVITY: Activity as tolerated    WOUND CARE: None    EQUIPMENT needed: None      DISCHARGE MEDICATIONS:  Current Discharge Medication List      START taking these medications    Details   nicotine (NICODERM CQ) 21 mg/24 hr 1 Patch by TransDERmal route every twenty-four (24) hours for 30 days. Qty: 30 Patch, Refills: 0      amoxicillin-clavulanate (Augmentin) 875-125 mg per tablet Take 1 Tab by mouth every twelve (12) hours for 5 days. Qty: 10 Tab, Refills: 0         CONTINUE these medications which have NOT CHANGED    Details   cyclobenzaprine (FLEXERIL) 10 mg tablet Take 1 Tab by mouth three (3) times daily as needed for Muscle Spasm(s). Qty: 12 Tab, Refills: 0               NOTIFY YOUR PHYSICIAN FOR ANY OF THE FOLLOWING:   Fever over 101 degrees for 24 hours. Chest pain, shortness of breath, fever, chills, nausea, vomiting, diarrhea, change in mentation, falling, weakness, bleeding. Severe pain or pain not relieved by medications. Or, any other signs or symptoms that you may have questions about.     DISPOSITION:    Home With:   OT  PT  HH  RN       Long term SNF/Inpatient Rehab Independent/assisted living    Hospice    Other:       PATIENT CONDITION AT DISCHARGE:     Functional status    Poor     Deconditioned     Independent      Cognition     Lucid     Forgetful     Dementia      Catheters/lines (plus indication)    Ayala     PICC     PEG     None      Code status     Full code     DNR      PHYSICAL EXAMINATION AT DISCHARGE:  General:          Alert, cooperative, no distress, appears stated age. HEENT:           Atraumatic, anicteric sclerae, pink conjunctivae                          No oral ulcers, mucosa moist, throat clear, dentition fair  Neck:               Supple, symmetrical  Lungs:             Clear to auscultation bilaterally. No Wheezing or Rhonchi. No rales. Chest wall:      No tenderness  No Accessory muscle use. Heart:              Regular  rhythm,  No  murmur   No edema  Abdomen:        Soft, non-tender. Not distended. Bowel sounds normal  Extremities:     No cyanosis. No clubbing,                            Skin turgor normal, Capillary refill normal  Skin:                Not pale. Not Jaundiced  No rashes   Psych:             Not anxious or agitated.   Neurologic:      Alert, moves all extremities, answers questions appropriately and responds to commands       CHRONIC MEDICAL DIAGNOSES:  Problem List as of 3/20/2020 Never Reviewed          Codes Class Noted - Resolved    Pneumonia ICD-10-CM: J18.9  ICD-9-CM: 852  3/18/2020 - Present              Greater than 60 minutes were spent with the patient on counseling and coordination of care    Signed:   Josef Hill MD  3/20/2020  3:49 PM

## 2020-03-20 NOTE — CONSULTS
PULMONARY/CRITICAL CARE/SLEEP MEDICINE    Initial Physician Consultation Note    Name: Heber Thompson   : 1995   MRN: [id number]   Date: 3/20/2020      Subjective:   Consult Note: 3/20/2020   Requesting Physician: Dr. Brittni Velez  Reason for consult: Pulmonary infiltrates, resp failure    Medical records and data reviewed. Patient is a 25 y.o. male who presented to the hospital with unresponsiveness after inhalational opiate overdose. Mental status improved with narcan. He developed hypoxic resp failure requiring ICU admission and increased FiO2 which is being weaned down rapidly and when seen earlier today, he was on room air. CT of the chest shows dense air space disease differential diagnosis of which includes inhalational lung injury or aspiration pneumonitis. He is completing empiric antibiotics for 7 days    Review of Systems:     A comprehensive 12 system review of systems was negative except for as documented in HPI    Assessment:     Acute hypoxic resp failure  Bilateral pulmonary infiltrates, inhalational lung injury versus aspiration pneumonitis  Substance abuse    Recommendations:     Keep O2 off  Agree with completion of empiric antibiotics  Repeat CXR in 6-8 weeks to ensure resolution  Substance abuse cessation counseling done  OK for discharge from pulmonary standpoint- we will be available to see him again as needed    Active Problem List:     Problem List  Never Reviewed          Codes Class    Pneumonia ICD-10-CM: J18.9  ICD-9-CM: 5               Past Medical History:      has no past medical history on file. Past Surgical History:      has no past surgical history on file. Home Medications:     Prior to Admission medications    Medication Sig Start Date End Date Taking? Authorizing Provider   nicotine (NICODERM CQ) 21 mg/24 hr 1 Patch by TransDERmal route every twenty-four (24) hours for 30 days.  3/20/20 4/19/20 Yes Medina Mullen MD   amoxicillin-clavulanate (Augmentin) 875-125 mg per tablet Take 1 Tab by mouth every twelve (12) hours for 5 days. 3/20/20 3/25/20 Yes Isi Mckeon MD   cyclobenzaprine (FLEXERIL) 10 mg tablet Take 1 Tab by mouth three (3) times daily as needed for Muscle Spasm(s). 20   Baldev Yoder MD       Allergies/Social/Family History:     No Known Allergies   Social History     Tobacco Use    Smoking status: Current Every Day Smoker   Substance Use Topics    Alcohol use: Yes     Comment: sometimes      History reviewed. No pertinent family history. Objective:   Vital Signs:  Visit Vitals  /65 (BP 1 Location: Left arm, BP Patient Position: At rest)   Pulse 94   Temp 98.1 °F (36.7 °C)   Resp 18   Ht 5' 5\" (1.651 m)   Wt 56.5 kg (124 lb 9 oz)   SpO2 95%   BMI 20.73 kg/m²    O2 Flow Rate (L/min): 30 l/min O2 Device: Room air Temp (24hrs), Av.1 °F (36.7 °C), Min:97.4 °F (36.3 °C), Max:98.9 °F (37.2 °C)           Intake/Output:     Intake/Output Summary (Last 24 hours) at 3/20/2020 1833  Last data filed at 3/20/2020 1209  Gross per 24 hour   Intake 780 ml   Output 1550 ml   Net -770 ml       Physical Exam:   General:  Alert, cooperative, no distress, appears stated age. Head:  Normocephalic, without obvious abnormality, atraumatic. Eyes:  Conjunctivae/corneas clear. PERRL   Neck: Supple, symmetrical, no adenopathy, no carotid bruit and no JVD. Lungs:   Few inspiratory crackles   Chest wall:  No tenderness or deformity. Heart:  Regular rate and rhythm, S1-S2 normal, no murmur, no click, rub or gallop. Abdomen:   Soft, non-tender. Bowel sounds present. No masses,  No organomegaly. Extremities: Atraumatic, no cyanosis or edema.    Pulses: Palpable   Skin: No rashes or lesions   Neurologic: Grossly nonfocal         LABS AND  DATA: Personally reviewed  Recent Labs     20  0341 20  2344   WBC 17.7* 7.8   HGB 14.6 14.5   HCT 42.7 44.2    238     Recent Labs     20  0332 20  1520   * 133*   K 3.6 3.8    100 CO2 26 27   BUN 9 9   CREA 0.94 0.83   * 94   CA 9.3 9.1     Recent Labs     03/17/20  2344   SGOT 22  22   AP 62  67   TP 7.0  6.8   ALB 3.4*  3.3*   GLOB 3.6  3.5     Recent Labs     03/17/20  2344   INR 1.1   PTP 11.5*   APTT 23.6      Recent Labs     03/18/20  0144   PHI 7.334*   PCO2I 45.5*   PO2I 62*   FIO2I 100     Recent Labs     03/18/20  0114   TROIQ <0.05       MEDS: Reviewed    Chest Imaging: personally reviewed and report checked    Tele- reviewed    Medical decision making:   I have reviewed the flowsheet and previous day's notes  Patient has acute or chronic illness that poses a threat to life or bodily function  Review and order of Clinical lab tests  Review and Order of Radiology tests  Independent visualization of Image          Thank you for allowing me to participate in this patient's care.     Rosa Shah MD      [facility]

## 2020-03-20 NOTE — PROGRESS NOTES
[facility] Adult  Hospitalist Group                                                                                          Hospitalist Progress Note  Osvaldo Mason MD  Answering service: [phone] OR [phone] from in house phone        Date of Service:  3/20/2020  NAME:  Tiff Ibarra  :  1995  MRN:  [id number]      Admission Summary:     Patient is a 41-year-old -American male who presents to the emergency room by EMS after he was found unresponsive with pinpoint pupils. Also with initial Pittore rate of 6. Patient was given Narcan and became more awake. On further questioning it was noted that patient has used heroin prior to the event. Patient was initially admitted to ICU and was requiring 100% nonrebreather. Now is weaned to high flow oxygen. Further work-up with CT of the chest shows multilobar, dense airspace consolidation, edema slightly favored over pneumonia per report. Was started on antibiotics. Patient is more alert and stable and therefore downgraded to [facility]. Interval history / Subjective:       No acute complaint. Still requiring high flow oxygen. Assessment & Plan:     Acute respiratory failure  Acute hypoxic and hypercapnic respiratory failure secondary to heroin overdose and complicated by pneumonia versus pulmonary edema. Currently requiring high flow oxygen. Consult pulmonology.     Pneumonia  Suspected aspiration pneumonia, white count is elevated. Continue Zosyn. Follow blood cultures. Respiratory panel was negative.     Pulmonary edema  BMP and troponin are normal limits. Check echocardiogram for further evaluation. Trial of daily Lasix.     Polysubstance abuse  Urine drug screen positive for cocaine and THC. Counseling done. HIV testing was done and negative.     Alcohol abuse  Monitor for withdrawal.  Thiamine, folic acid and multivitamin.     Tobacco use  Nicotine replacement.     Code status: FULL  DVT prophylaxis: Lovenox    Care Plan discussed with: Patient/Family  Anticipated Disposition: Home w/Family  Anticipated Discharge: Greater than 48 hours     Hospital Problems  Never Reviewed          Codes Class Noted POA    Pneumonia ICD-10-CM: J18.9  ICD-9-CM: 520  3/18/2020 Unknown                Review of Systems:   A comprehensive review of systems was negative except for that written in the HPI. Vital Signs:    Last 24hrs VS reviewed since prior progress note. Most recent are:  Visit Vitals  /63   Pulse 71   Temp 98.5 °F (36.9 °C)   Resp 16   Ht 5' 5\" (1.651 m)   Wt 56.5 kg (124 lb 9 oz)   SpO2 98%   BMI 20.73 kg/m²         Intake/Output Summary (Last 24 hours) at 3/20/2020 1125  Last data filed at 3/20/2020 0405  Gross per 24 hour   Intake 550 ml   Output 2075 ml   Net -1525 ml        Physical Examination:             Constitutional:  No acute distress, cooperative, pleasant    ENT:  Oral mucosa moist, oropharynx benign. Resp:  CTA bilaterally. No wheezing/rhonchi/rales. No accessory muscle use   CV:  Regular rhythm, normal rate, no murmurs, gallops, rubs    GI:  Soft, non distended, non tender. normoactive bowel sounds, no hepatosplenomegaly     Musculoskeletal:  No edema, warm, 2+ pulses throughout    Neurologic:  Moves all extremities.   AAOx3, CN II-XII reviewed     Skin:  Good turgor, no rashes or ulcers       Data Review:    Review and/or order of clinical lab test      Labs:     Recent Labs     03/19/20  0341 03/17/20  2344   WBC 17.7* 7.8   HGB 14.6 14.5   HCT 42.7 44.2    238     Recent Labs     03/20/20  0332 03/19/20  1520 03/19/20  0341   * 133* 136   K 3.6 3.8 3.6    100 102   CO2 26 27 27   BUN 9 9 7   CREA 0.94 0.83 0.90   * 94 101*   CA 9.3 9.1 8.5     Recent Labs     03/17/20  2344   SGOT 22  22   ALT 15  14   AP 62  67   TBILI 0.5  0.5   TP 7.0  6.8   ALB 3.4*  3.3*   GLOB 3.6  3.5     Recent Labs     03/17/20  2344   INR 1.1   PTP 11.5*   APTT 23.6      No results for input(s): FE, TIBC, PSAT, FERR in the last 72 hours. No results found for: FOL, RBCF   No results for input(s): PH, PCO2, PO2 in the last 72 hours.   Recent Labs     03/18/20  0114   TROIQ <0.05     No results found for: CHOL, CHOLX, CHLST, CHOLV, HDL, HDLP, LDL, LDLC, DLDLP, TGLX, TRIGL, TRIGP, CHHD, CHHDX  Lab Results   Component Value Date/Time    Glucose (POC) 100 03/18/2020 05:59 AM     Lab Results   Component Value Date/Time    Color YELLOW/STRAW 03/18/2020 01:41 AM    Appearance CLEAR 03/18/2020 01:41 AM    Specific gravity 1.022 03/18/2020 01:41 AM    Specific gravity >1.030 (H) 01/09/2020 01:42 AM    pH (UA) 6.0 03/18/2020 01:41 AM    Protein 100 (A) 03/18/2020 01:41 AM    Glucose 500 (A) 03/18/2020 01:41 AM    Ketone NEGATIVE  03/18/2020 01:41 AM    Bilirubin NEGATIVE  03/18/2020 01:41 AM    Urobilinogen 1.0 03/18/2020 01:41 AM    Nitrites NEGATIVE  03/18/2020 01:41 AM    Leukocyte Esterase NEGATIVE  03/18/2020 01:41 AM    Epithelial cells FEW 03/18/2020 01:41 AM    Bacteria NEGATIVE  03/18/2020 01:41 AM    WBC 0-4 03/18/2020 01:41 AM    RBC 0-5 03/18/2020 01:41 AM         Medications Reviewed:     Current Facility-Administered Medications   Medication Dose Route Frequency    influenza vaccine 2019-20 (6 mos+)(PF) (FLUARIX/FLULAVAL/FLUZONE QUAD) injection 0.5 mL  0.5 mL IntraMUSCular PRIOR TO DISCHARGE    piperacillin-tazobactam (ZOSYN) 3.375 g in 0.9% sodium chloride (MBP/ADV) 100 mL  3.375 g IntraVENous Q8H    therapeutic multivitamin (THERAGRAN) tablet 1 Tab  1 Tab Oral DAILY    furosemide (LASIX) injection 40 mg  40 mg IntraVENous DAILY    albuterol-ipratropium (DUO-NEB) 2.5 MG-0.5 MG/3 ML  3 mL Nebulization BID RT    naloxone (NARCAN) injection 0.4 mg  0.4 mg IntraVENous PRN    ondansetron (ZOFRAN) injection 4 mg  4 mg IntraVENous Q4H PRN    sodium chloride (NS) flush 5-40 mL  5-40 mL IntraVENous Q8H    sodium chloride (NS) flush 5-40 mL  5-40 mL IntraVENous PRN    enoxaparin (LOVENOX) injection 40 mg  40 mg SubCUTAneous Q24H    thiamine (B-1) 100 mg in 0.9% sodium chloride 50 mL IVPB  100 mg IntraVENous DAILY    folic acid (FOLVITE) tablet 1 mg  1 mg Oral DAILY    nicotine (NICODERM CQ) 21 mg/24 hr patch 1 Patch  1 Patch TransDERmal DAILY PRN     ______________________________________________________________________  EXPECTED LENGTH OF STAY: - - -  ACTUAL LENGTH OF STAY:          2                 Eulalia Plunkett MD

## 2020-03-20 NOTE — PROGRESS NOTES
PCP ROSALIND appt scheduled with [facility] to see PT in 24-48 hours after discharge at 9:00am. Appt added to [address] CM Specialist      PCP Office closed

## 2020-03-21 ENCOUNTER — PATIENT OUTREACH (OUTPATIENT)
Dept: INTERNAL MEDICINE CLINIC | Age: 25
End: 2020-03-21

## 2020-03-21 LAB
BACTERIA SPEC CULT: NORMAL
ECHO AO ROOT DIAM: 3.07 CM
ECHO AV PEAK GRADIENT: 3.9 MMHG
ECHO AV PEAK VELOCITY: 98.71 CM/S
ECHO IVC SNIFF: 1.09 CM
ECHO LA MAJOR AXIS: 2.28 CM
ECHO LA TO AORTIC ROOT RATIO: 0.74
ECHO LV INTERNAL DIMENSION DIASTOLIC: 3.94 CM (ref 4.2–5.9)
ECHO LV INTERNAL DIMENSION SYSTOLIC: 2.7 CM
ECHO LV IVSD: 0.75 CM (ref 0.6–1)
ECHO LV MASS 2D: 94.3 G (ref 88–224)
ECHO LV MASS INDEX 2D: 58.3 G/M2 (ref 49–115)
ECHO LV POSTERIOR WALL DIASTOLIC: 0.79 CM (ref 0.6–1)
ECHO LVOT DIAM: 2.27 CM
ECHO MV A VELOCITY: 45.67 CM/S
ECHO MV AREA PHT: 2.7 CM2
ECHO MV E DECELERATION TIME (DT): 278.9 MS
ECHO MV E VELOCITY: 44.06 CM/S
ECHO MV E/A RATIO: 0.96
ECHO MV PRESSURE HALF TIME (PHT): 80.9 MS
ECHO PV MAX VELOCITY: 91.9 CM/S
ECHO PV PEAK GRADIENT: 3.4 MMHG
ECHO RV INTERNAL DIMENSION: 2.71 CM
ECHO RV TAPSE: 1.15 CM (ref 1.5–2)
ECHO TV REGURGITANT MAX VELOCITY: 235.82 CM/S
ECHO TV REGURGITANT PEAK GRADIENT: 22.2 MMHG
GRAM STN SPEC: NORMAL
LVFS 2D: 31.49 %
MV DEC SLOPE: 1.58
SERVICE CMNT-IMP: NORMAL

## 2020-03-23 LAB
BACTERIA SPEC CULT: NORMAL
SERVICE CMNT-IMP: NORMAL

## 2020-07-05 ENCOUNTER — APPOINTMENT (OUTPATIENT)
Dept: GENERAL RADIOLOGY | Age: 25
End: 2020-07-05
Attending: PHYSICIAN ASSISTANT
Payer: COMMERCIAL

## 2020-07-05 ENCOUNTER — HOSPITAL ENCOUNTER (EMERGENCY)
Age: 25
Discharge: HOME | End: 2020-07-05
Attending: EMERGENCY MEDICINE | Admitting: EMERGENCY MEDICINE
Payer: COMMERCIAL

## 2020-07-05 VITALS
WEIGHT: 133.16 LBS | HEIGHT: 66 IN | HEART RATE: 63 BPM | OXYGEN SATURATION: 99 % | DIASTOLIC BLOOD PRESSURE: 85 MMHG | BODY MASS INDEX: 21.4 KG/M2 | TEMPERATURE: 98.3 F | SYSTOLIC BLOOD PRESSURE: 129 MMHG | RESPIRATION RATE: 16 BRPM

## 2020-07-05 DIAGNOSIS — S60.419A ABRASION OF MULTIPLE SITES OF LEFT HAND AND FINGER, INITIAL ENCOUNTER: ICD-10-CM

## 2020-07-05 DIAGNOSIS — S60.512A ABRASION OF MULTIPLE SITES OF LEFT HAND AND FINGER, INITIAL ENCOUNTER: ICD-10-CM

## 2020-07-05 DIAGNOSIS — S60.222A CONTUSION OF LEFT HAND, INITIAL ENCOUNTER: Primary | ICD-10-CM

## 2020-07-05 PROCEDURE — 99283 EMERGENCY DEPT VISIT LOW MDM: CPT

## 2020-07-05 PROCEDURE — 74011250637 HC RX REV CODE- 250/637: Performed by: PHYSICIAN ASSISTANT

## 2020-07-05 PROCEDURE — 73130 X-RAY EXAM OF HAND: CPT

## 2020-07-05 RX ORDER — IBUPROFEN 800 MG/1
800 TABLET ORAL
Qty: 20 TAB | Refills: 0 | Status: SHIPPED | OUTPATIENT
Start: 2020-07-05 | End: 2020-07-12

## 2020-07-05 RX ORDER — IBUPROFEN 400 MG/1
800 TABLET ORAL
Status: COMPLETED | OUTPATIENT
Start: 2020-07-05 | End: 2020-07-05

## 2020-07-05 RX ADMIN — IBUPROFEN 800 MG: 400 TABLET, FILM COATED ORAL at 13:13

## 2020-07-05 NOTE — DISCHARGE INSTRUCTIONS
Patient Education        Hand Bruises: Care Instructions  Your Care Instructions  Bruises, or contusions, can happen as a result of an impact or fall. Most people think of a bruise as a black-and-blue spot. This happens when small blood vessels get torn and leak blood under the skin. The bruise may turn purplish black, reddish blue, or yellowish green as it heals. But bones and muscles can also get bruised. This may damage the hand but not cause a bruise that you can see. Most bruises aren't serious and will go away on their own in 2 to 4 weeks. But sometimes a more serious hand injury might not heal on its own. Tell your doctor if you have new symptoms or your injury is not getting better over time. You may have tests to see if you have bone or nerve damage. These tests may include X-rays, a CT scan, or an MRI. If you damaged bones or muscles, you may need more treatment. The doctor has checked you carefully, but problems can develop later. If you notice any problems or new symptoms, get medical treatment right away. Follow-up care is a key part of your treatment and safety. Be sure to make and go to all appointments, and call your doctor if you are having problems. It's also a good idea to know your test results and keep a list of the medicines you take. How can you care for yourself at home? · Put ice or a cold pack on the hand for 10 to 20 minutes at a time. Put a thin cloth between the ice and your skin. · Prop up your hand on a pillow when you ice it or anytime you sit or lie down during the next 3 days. Try to keep your hand above the level of your heart. This will help reduce swelling. · Be safe with medicines. Read and follow all instructions on the label. ? If the doctor gave you a prescription medicine for pain, take it as prescribed. ? If you are not taking a prescription pain medicine, ask your doctor if you can take an over-the-counter medicine.   · Be sure to follow your doctor's advice about moving and exercising your injured hand. When should you call for help? Call your doctor now or seek immediate medical care if:  · Your pain gets worse. · You have new or worse swelling. · You have tingling, weakness, or numbness in the area near the bruise. · The area near the bruise is cold or pale. · You have symptoms of infection, such as:  ? Increased pain, swelling, warmth, or redness. ? Red streaks leading from the area. ? Pus draining from the area. ? A fever. Watch closely for changes in your health, and be sure to contact your doctor if:  · You do not get better as expected. Where can you learn more? Go to http://deanna-whit.info/  Enter W540 in the search box to learn more about \"Hand Bruises: Care Instructions. \"  Current as of: June 26, 2019               Content Version: 12.5  © 5601-6828 [employer]. Care instructions adapted under license by [employer] (which disclaims liability or warranty for this information). If you have questions about a medical condition or this instruction, always ask your healthcare professional. [place] [address] any warranty or liability for your use of this information. Patient Education        Scrapes (Abrasions): Care Instructions  Your Care Instructions  Scrapes (abrasions) are wounds where your skin has been rubbed or torn off. Most scrapes do not go deep into the skin, but some may remove several layers of skin. Scrapes usually don't bleed much, but they may ooze pinkish fluid. Scrapes on the head or face may appear worse than they are. They may bleed a lot because of the good blood supply to this area. Most scrapes heal well and may not need a bandage. They usually heal within 3 to 7 days. A large, deep scrape may take 1 to 2 weeks or longer to heal. A scab may form on some scrapes. Follow-up care is a key part of your treatment and safety.  Be sure to make and go to all appointments, and call your doctor if you are having problems. It's also a good idea to know your test results and keep a list of the medicines you take. How can you care for yourself at home? · If your doctor told you how to care for your wound, follow your doctor's instructions. If you did not get instructions, follow this general advice:  ? Wash the scrape with clean water 2 times a day. Don't use hydrogen peroxide or alcohol, which can slow healing. ? You may cover the scrape with a thin layer of petroleum jelly, such as Vaseline, and a nonstick bandage. ? Apply more petroleum jelly and replace the bandage as needed. · Prop up the injured area on a pillow anytime you sit or lie down during the next 3 days. Try to keep it above the level of your heart. This will help reduce swelling. · Be safe with medicines. Take pain medicines exactly as directed. ? If the doctor gave you a prescription medicine for pain, take it as prescribed. ? If you are not taking a prescription pain medicine, ask your doctor if you can take an over-the-counter medicine. When should you call for help? Call your doctor now or seek immediate medical care if:  · You have signs of infection, such as:  ? Increased pain, swelling, warmth, or redness around the scrape. ? Red streaks leading from the scrape. ? Pus draining from the scrape. ? A fever. · The scrape starts to bleed, and blood soaks through the bandage. Oozing small amounts of blood is normal.  Watch closely for changes in your health, and be sure to contact your doctor if the scrape is not getting better each day. Where can you learn more? Go to http://deanna-whit.info/  Enter A374 in the search box to learn more about \"Scrapes (Abrasions): Care Instructions. \"  Current as of: June 26, 2019               Content Version: 12.5  © 6630-6601 [employer].    Care instructions adapted under license by [employer] (which disclaims liability or warranty for this information). If you have questions about a medical condition or this instruction, always ask your healthcare professional. [place] [address] any warranty or liability for your use of this information.

## 2020-07-05 NOTE — ED PROVIDER NOTES
EMERGENCY DEPARTMENT HISTORY AND PHYSICAL EXAM      Date: 7/5/2020  Patient Name: Reema Rudolph    History of Presenting Illness     Chief Complaint   Patient presents with    Laceration     cut his left hand an a glass table this morning at 5am because he was mad. History Provided By: Patient    HPI: Reema Rudolph, right-hand-dominant 22 y.o. male with history significant for tobacco abuse who presents via private vehicle to the ED with cc of acute moderate aching left hand pain and multiple cuts secondary to punching through a glass window at 5 AM today. Patient endorses that he became frustrated while he was helping someone move from there home and so he punched the window with his left hand. Patient is unsure when his last tetanus booster was. Pain and bleeding controlled with pressure. Pain exacerbated with movement and palpation. No numbness, tingling, limited range of motion, focal weakness. No other medications or alleviating factors. PCP: None    There are no other complaints, changes, or physical findings at this time. No current facility-administered medications on file prior to encounter. Current Outpatient Medications on File Prior to Encounter   Medication Sig Dispense Refill    [DISCONTINUED] cyclobenzaprine (FLEXERIL) 10 mg tablet Take 1 Tab by mouth three (3) times daily as needed for Muscle Spasm(s). 12 Tab 0     Past History     Past Medical History:  No past medical history on file. Past Surgical History:  No past surgical history on file. Family History:  No family history on file. Social History:  Social History     Tobacco Use    Smoking status: Current Every Day Smoker   Substance Use Topics    Alcohol use: Yes     Comment: sometimes    Drug use: No     Allergies:  No Known Allergies  Review of Systems   Review of Systems   Constitutional: Negative for activity change, chills, fatigue and fever. HENT: Negative. Eyes: Negative. Respiratory: Negative. Negative for cough and shortness of breath. Cardiovascular: Negative for chest pain, palpitations and leg swelling. Gastrointestinal: Negative for abdominal pain, diarrhea, nausea and vomiting. Genitourinary: Negative for dysuria. Musculoskeletal: Positive for arthralgias. Negative for back pain, joint swelling, neck pain and neck stiffness. Skin: Positive for wound. Negative for rash. Neurological: Negative. Negative for weakness, numbness and headaches. Psychiatric/Behavioral: Negative. Physical Exam   Physical Exam  Vitals signs and nursing note reviewed. Constitutional:       General: He is not in acute distress. Appearance: He is well-developed. He is not diaphoretic. HENT:      Head: Normocephalic and atraumatic. Right Ear: Hearing and external ear normal.      Left Ear: Hearing and external ear normal.      Nose: Nose normal.   Eyes:      Conjunctiva/sclera: Conjunctivae normal.      Pupils: Pupils are equal, round, and reactive to light. Neck:      Musculoskeletal: Normal range of motion. Pulmonary:      Effort: Pulmonary effort is normal. No accessory muscle usage or respiratory distress. Musculoskeletal: Normal range of motion. Left wrist: Normal.      Right hand: Normal. He exhibits normal range of motion, no tenderness, no bony tenderness, normal two-point discrimination, normal capillary refill, no deformity, no laceration and no swelling. Normal sensation noted. Normal strength noted. Left hand: He exhibits tenderness, bony tenderness and swelling. He exhibits normal range of motion, normal two-point discrimination, normal capillary refill, no deformity and no laceration. Normal sensation noted. Normal strength noted. Skin:     General: Skin is warm and dry. Coloration: Skin is not pale. Findings: Wound present. Neurological:      Mental Status: He is alert and oriented to person, place, and time.    Psychiatric:         Speech: Speech normal.         Behavior: Behavior normal.         Thought Content: Thought content normal.         Judgment: Judgment normal.       Diagnostic Study Results   Labs -   No results found for this or any previous visit (from the past 12 hour(s)). Radiologic Studies -   XR HAND LT MIN 3 V   Final Result   IMPRESSION: No radiographic evidence of acute abnormality. Xr Hand Lt Min 3 V    Result Date: 7/5/2020  IMPRESSION: No radiographic evidence of acute abnormality. Medical Decision Making   I am the first provider for this patient. I reviewed the vital signs, available nursing notes, past medical history, past surgical history, family history and social history. Vital Signs-Reviewed the patient's vital signs. Patient Vitals for the past 24 hrs:   Temp Pulse Resp BP SpO2   07/05/20 1137 98.3 °F (36.8 °C) 63 16 129/85 99 %     Pulse Oximetry Analysis - 99% on RA and normal    Records Reviewed: Nursing Notes, Old Medical Records, Previous Radiology Studies and Previous Laboratory Studies    Provider Notes (Medical Decision Making):   Patient presents with Lt hand pain after trauma. DDx: dislocation, fracture, contusion, laceration, abrasion. Will get analgesics and xrays. Neurovasularly intact. ED Course:   Initial assessment performed. The patients presenting problems have been discussed, and they are in agreement with the care plan formulated and outlined with them. I have encouraged them to ask questions as they arise throughout their visit. Progress Note:   Updated pt on all returned results and findings. Discussed the importance of proper follow up as referred below along with return precautions. Pt in agreement with the care plan and expresses agreement with and understanding of all items discussed. Disposition:  12:45 PM  I have discussed with patient their diagnosis, treatment, and follow up plan.  The patient agrees to follow up as outlined in discharge paperwork and also to return to the ED with any worsening. Bentley Bucio PA-C      PLAN:  1. Current Discharge Medication List      START taking these medications    Details   ibuprofen (MOTRIN) 800 mg tablet Take 1 Tab by mouth every six (6) hours as needed for Pain for up to 7 days. Qty: 20 Tab, Refills: 0           2. Follow-up Information     Follow up With Specialties Details Why Contact Info    Yamileth Cornejo MD Hand Surgery, General Surgery Schedule an appointment as soon as possible for a visit in 1 week As needed, If symptoms worsen [facility] [address] [phone]          Return to ED if worse     Diagnosis     Clinical Impression:   1. Contusion of left hand, initial encounter    2. Abrasion of multiple sites of left hand and finger, initial encounter            Please note that this dictation was completed with Dragon, computer voice recognition software. Quite often unanticipated grammatical, syntax, homophones, and other interpretive errors are inadvertently transcribed by the computer software. Please disregard these errors. Additionally, please excuse any errors that have escaped final proofreading.

## 2021-03-05 ENCOUNTER — HOSPITAL ENCOUNTER (EMERGENCY)
Age: 26
Discharge: HOME | End: 2021-03-05
Attending: STUDENT IN AN ORGANIZED HEALTH CARE EDUCATION/TRAINING PROGRAM
Payer: COMMERCIAL

## 2021-03-05 VITALS
RESPIRATION RATE: 16 BRPM | DIASTOLIC BLOOD PRESSURE: 75 MMHG | HEART RATE: 55 BPM | BODY MASS INDEX: 21.8 KG/M2 | SYSTOLIC BLOOD PRESSURE: 117 MMHG | WEIGHT: 133 LBS | OXYGEN SATURATION: 98 % | TEMPERATURE: 98.5 F

## 2021-03-05 DIAGNOSIS — Z76.89 ENCOUNTER FOR ASSESSMENT OF STD EXPOSURE: Primary | ICD-10-CM

## 2021-03-05 PROCEDURE — 99283 EMERGENCY DEPT VISIT LOW MDM: CPT

## 2021-03-05 PROCEDURE — 87491 CHLMYD TRACH DNA AMP PROBE: CPT

## 2021-03-05 NOTE — ED PROVIDER NOTES
EMERGENCY DEPARTMENT HISTORY AND PHYSICAL EXAM      Date: 3/5/2021  Patient Name: June Koehler    History of Presenting Illness     Chief Complaint   Patient presents with    Exposure to STD         HPI: June Koehler, 22 y.o. male presents to the ED with cc of exposure to possible STD. Has not been sexual partner who is complaining of discharge. He currently denies any symptoms. No dysuria or hematuria, no rashes, fevers, vomiting or diarrhea. Does report a history of an STD in the past.          There are no other complaints, changes, or physical findings at this time. PCP: None    No current facility-administered medications on file prior to encounter. No current outpatient medications on file prior to encounter. Past History     Past Medical History:  No past medical history on file. Past Surgical History:  No past surgical history on file. Family History:  No family history on file. Social History:  Social History     Tobacco Use    Smoking status: Current Every Day Smoker   Substance Use Topics    Alcohol use: Yes     Comment: sometimes    Drug use: No       Allergies:  No Known Allergies      Review of Systems   no fever  no shortness of breath  no chest pain  no abdominal pain  no dysuria    Physical Exam   Physical Exam  Constitutional:       General: He is not in acute distress. HENT:      Head: Normocephalic. Cardiovascular:      Rate and Rhythm: Normal rate. Pulmonary:      Effort: Pulmonary effort is normal.   Abdominal:      Palpations: Abdomen is soft. Tenderness: There is no abdominal tenderness. Musculoskeletal:         General: No deformity. Skin:     General: Skin is warm. Neurological:      General: No focal deficit present. Mental Status: He is alert. Psychiatric:         Mood and Affect: Mood normal.         Diagnostic Study Results     Labs -   No results found for this or any previous visit (from the past 24 hour(s)).     Radiologic Studies -   No orders to display     CT Results  (Last 48 hours)    None        CXR Results  (Last 48 hours)    None            Medical Decision Making   I am the first provider for this patient. I reviewed the vital signs, available nursing notes, past medical history, past surgical history, family history and social history. Vital Signs-Reviewed the patient's vital signs. Patient Vitals for the past 24 hrs:   Temp Pulse Resp BP SpO2   03/05/21 0707 98.5 °F (36.9 °C) (!) 55 16 117/75 98 %         Provider Notes (Medical Decision Making):   80-year-old male presenting with STD exposure. Denies any symptoms. Abdominal exam benign. Urine GC chlamydia will be sent. Declines treatment at this time, will wait for results of tests, counseled on using protection until results of test.    ED Course:     Initial assessment performed. The patients presenting problems have been discussed, and they are in agreement with the care plan formulated and outlined with them. I have encouraged them to ask questions as they arise throughout their visit. Patient is counseled on supportive care and return precautions. Will return to the ED for any development of significant symptoms. Will followup with primary care doctor within 7-10 days. Critical Care Time:         Disposition:  Home    PLAN:  1. There are no discharge medications for this patient.     2.   Follow-up Information    None       Return to ED if worse     Diagnosis     Clinical Impression: Acute STD exposure

## 2021-03-05 NOTE — ED NOTES
Patient here with c/o exposure to STD. Patient reports vague intermittent symptoms however states that his partner has had more noticeable symptoms. Patient denies fevers. Emergency Department Nursing Plan of Care       The Nursing Plan of Care is developed from the Nursing assessment and Emergency Department Attending provider initial evaluation. The plan of care may be reviewed in the ED Provider note.     The Plan of Care was developed with the following considerations:   Patient / Family readiness to learn indicated by:verbalized understanding  Persons(s) to be included in education: patient  Barriers to Learning/Limitations:No    Signed     Geraldine Bhatti RN    3/5/2021   7:29 AM

## 2021-03-08 LAB
C TRACH DNA SPEC QL NAA+PROBE: POSITIVE
N GONORRHOEA DNA SPEC QL NAA+PROBE: NEGATIVE
SAMPLE TYPE: ABNORMAL
SERVICE CMNT-IMP: ABNORMAL
SPECIMEN SOURCE: ABNORMAL

## 2021-03-08 RX ORDER — AZITHROMYCIN 500 MG/1
1000 TABLET, FILM COATED ORAL
Qty: 2 TAB | Refills: 0 | Status: SHIPPED | OUTPATIENT
Start: 2021-03-08 | End: 2021-03-08

## 2022-02-14 ENCOUNTER — HOSPITAL ENCOUNTER (EMERGENCY)
Age: 27
Discharge: HOME | End: 2022-02-15
Attending: EMERGENCY MEDICINE
Payer: COMMERCIAL

## 2022-02-14 VITALS
RESPIRATION RATE: 18 BRPM | WEIGHT: 140 LBS | DIASTOLIC BLOOD PRESSURE: 74 MMHG | BODY MASS INDEX: 23.32 KG/M2 | SYSTOLIC BLOOD PRESSURE: 123 MMHG | HEART RATE: 78 BPM | HEIGHT: 65 IN | TEMPERATURE: 98.9 F | OXYGEN SATURATION: 100 %

## 2022-02-14 DIAGNOSIS — K29.00 ACUTE GASTRITIS WITHOUT HEMORRHAGE, UNSPECIFIED GASTRITIS TYPE: Primary | ICD-10-CM

## 2022-02-14 LAB
ALBUMIN SERPL-MCNC: 3.6 G/DL (ref 3.5–5)
ALBUMIN/GLOB SERPL: 1.2 {RATIO} (ref 1.1–2.2)
ALP SERPL-CCNC: 68 U/L (ref 45–117)
ALT SERPL-CCNC: 22 U/L (ref 12–78)
ANION GAP SERPL CALC-SCNC: 9 MMOL/L (ref 5–15)
AST SERPL-CCNC: 34 U/L (ref 15–37)
BASOPHILS # BLD: 0.1 K/UL (ref 0–0.1)
BASOPHILS NFR BLD: 1 % (ref 0–1)
BILIRUB SERPL-MCNC: 0.4 MG/DL (ref 0.2–1)
BUN SERPL-MCNC: 14 MG/DL (ref 6–20)
BUN/CREAT SERPL: 13 (ref 12–20)
CALCIUM SERPL-MCNC: 8.8 MG/DL (ref 8.5–10.1)
CHLORIDE SERPL-SCNC: 105 MMOL/L (ref 97–108)
CO2 SERPL-SCNC: 28 MMOL/L (ref 21–32)
CREAT SERPL-MCNC: 1.07 MG/DL (ref 0.7–1.3)
DIFFERENTIAL METHOD BLD: ABNORMAL
EOSINOPHIL # BLD: 0.1 K/UL (ref 0–0.4)
EOSINOPHIL NFR BLD: 1 % (ref 0–7)
ERYTHROCYTE [DISTWIDTH] IN BLOOD BY AUTOMATED COUNT: 12.5 % (ref 11.5–14.5)
GLOBULIN SER CALC-MCNC: 3.1 G/DL (ref 2–4)
GLUCOSE SERPL-MCNC: 81 MG/DL (ref 65–100)
HCT VFR BLD AUTO: 39.4 % (ref 36.6–50.3)
HGB BLD-MCNC: 13.5 G/DL (ref 12.1–17)
IMM GRANULOCYTES # BLD AUTO: 0 K/UL (ref 0–0.04)
IMM GRANULOCYTES NFR BLD AUTO: 0 % (ref 0–0.5)
LIPASE SERPL-CCNC: 151 U/L (ref 73–393)
LYMPHOCYTES # BLD: 3.5 K/UL (ref 0.8–3.5)
LYMPHOCYTES NFR BLD: 60 % (ref 12–49)
MCH RBC QN AUTO: 32.2 PG (ref 26–34)
MCHC RBC AUTO-ENTMCNC: 34.3 G/DL (ref 30–36.5)
MCV RBC AUTO: 94 FL (ref 80–99)
MONOCYTES # BLD: 0.4 K/UL (ref 0–1)
MONOCYTES NFR BLD: 7 % (ref 5–13)
NEUTS SEG # BLD: 1.8 K/UL (ref 1.8–8)
NEUTS SEG NFR BLD: 31 % (ref 32–75)
NRBC # BLD: 0 K/UL (ref 0–0.01)
NRBC BLD-RTO: 0 PER 100 WBC
PLATELET # BLD AUTO: 249 K/UL (ref 150–400)
PMV BLD AUTO: 10.2 FL (ref 8.9–12.9)
POTASSIUM SERPL-SCNC: 3.5 MMOL/L (ref 3.5–5.1)
PROT SERPL-MCNC: 6.7 G/DL (ref 6.4–8.2)
RBC # BLD AUTO: 4.19 M/UL (ref 4.1–5.7)
SODIUM SERPL-SCNC: 142 MMOL/L (ref 136–145)
WBC # BLD AUTO: 5.9 K/UL (ref 4.1–11.1)

## 2022-02-14 PROCEDURE — 85025 COMPLETE CBC W/AUTO DIFF WBC: CPT

## 2022-02-14 PROCEDURE — 80053 COMPREHEN METABOLIC PANEL: CPT

## 2022-02-14 PROCEDURE — 87491 CHLMYD TRACH DNA AMP PROBE: CPT

## 2022-02-14 PROCEDURE — 83690 ASSAY OF LIPASE: CPT

## 2022-02-14 PROCEDURE — 99283 EMERGENCY DEPT VISIT LOW MDM: CPT

## 2022-02-14 PROCEDURE — 74011000250 HC RX REV CODE- 250: Performed by: EMERGENCY MEDICINE

## 2022-02-14 PROCEDURE — 74011250637 HC RX REV CODE- 250/637: Performed by: EMERGENCY MEDICINE

## 2022-02-14 RX ORDER — FAMOTIDINE 20 MG/1
20 TABLET, FILM COATED ORAL
Status: COMPLETED | OUTPATIENT
Start: 2022-02-14 | End: 2022-02-14

## 2022-02-14 RX ORDER — FAMOTIDINE 20 MG/1
20 TABLET, FILM COATED ORAL 2 TIMES DAILY
Qty: 20 TABLET | Refills: 0 | Status: SHIPPED | OUTPATIENT
Start: 2022-02-14 | End: 2022-02-24

## 2022-02-14 RX ORDER — FAMOTIDINE 20 MG/1
20 TABLET, FILM COATED ORAL
Status: DISCONTINUED | OUTPATIENT
Start: 2022-02-14 | End: 2022-02-15 | Stop reason: HOSPADM

## 2022-02-14 RX ORDER — DICYCLOMINE HYDROCHLORIDE 10 MG/1
10 CAPSULE ORAL 3 TIMES DAILY
Qty: 30 CAPSULE | Refills: 0 | Status: SHIPPED | OUTPATIENT
Start: 2022-02-14

## 2022-02-14 RX ADMIN — LIDOCAINE HYDROCHLORIDE 40 ML: 20 SOLUTION ORAL; TOPICAL at 22:54

## 2022-02-14 RX ADMIN — FAMOTIDINE 20 MG: 20 TABLET ORAL at 22:54

## 2022-02-15 NOTE — ED PROVIDER NOTES
EMERGENCY DEPARTMENT HISTORY AND PHYSICAL EXAM      Date: 2/14/2022  Patient Name: Erica Tobin    Please note that this dictation was completed with Tongda, the computer voice recognition software. Quite often unanticipated grammatical, syntax, homophones, and other interpretive errors are inadvertently transcribed by the computer software. Please disregard these errors. Please excuse any errors that have escaped final proofreading. History of Presenting Illness     Chief Complaint   Patient presents with    Abdominal Pain    Concern For COVID-19 (Coronavirus)       History Provided By: Patient     HPI: Erica Tobin, 32 y.o. male, is in the emergency department complaining of abdominal pain, loose stool. Symptoms been going on for over a week. Reports at onset he had some nausea and vomiting, then developed some loose stool. Nausea and vomiting she resolved, but reports continued cramping pain mostly in the left upper quadrant and left lower quadrant. This is associated with loose stool. Nothing makes it better, nothing makes it worse. No testicular pain, no urinary symptoms. No nausea or vomiting in the last week. PCP: None    No current facility-administered medications on file prior to encounter. No current outpatient medications on file prior to encounter. Past History     Past Medical History:  History reviewed. No pertinent past medical history. Past Surgical History:  History reviewed. No pertinent surgical history. Family History:  History reviewed. No pertinent family history. Social History:  Social History     Tobacco Use    Smoking status: Current Every Day Smoker    Smokeless tobacco: Never Used   Substance Use Topics    Alcohol use: Yes     Comment: sometimes    Drug use: No       Allergies:  No Known Allergies      Review of Systems   Review of Systems   Constitutional: Negative for chills and fever. HENT: Negative for congestion and sore throat.     Eyes: Negative for visual disturbance. Respiratory: Negative for cough and shortness of breath. Cardiovascular: Negative for chest pain and leg swelling. Gastrointestinal: Positive for abdominal pain and diarrhea. Negative for blood in stool, nausea and vomiting. Endocrine: Negative for polyuria. Genitourinary: Negative for dysuria and testicular pain. Musculoskeletal: Negative for arthralgias, joint swelling and myalgias. Skin: Negative for rash. Allergic/Immunologic: Negative for immunocompromised state. Neurological: Negative for weakness and headaches. Hematological: Does not bruise/bleed easily. Psychiatric/Behavioral: Negative for confusion. Physical Exam   Physical Exam  Vitals and nursing note reviewed. Constitutional:       Appearance: He is well-developed. HENT:      Head: Normocephalic and atraumatic. Eyes:      General:         Right eye: No discharge. Left eye: No discharge. Conjunctiva/sclera: Conjunctivae normal.      Pupils: Pupils are equal, round, and reactive to light. Neck:      Trachea: No tracheal deviation. Cardiovascular:      Rate and Rhythm: Normal rate and regular rhythm. Heart sounds: Normal heart sounds. No murmur heard. Pulmonary:      Effort: Pulmonary effort is normal. No respiratory distress. Breath sounds: Normal breath sounds. No wheezing or rales. Abdominal:      General: Bowel sounds are normal.      Palpations: Abdomen is soft. Tenderness: There is no abdominal tenderness. There is no guarding or rebound. Musculoskeletal:         General: No tenderness or deformity. Normal range of motion. Cervical back: Normal range of motion and neck supple. Skin:     General: Skin is warm and dry. Findings: No erythema or rash. Neurological:      Mental Status: He is alert and oriented to person, place, and time.    Psychiatric:         Behavior: Behavior normal.         Diagnostic Study Results     Labs -   No results found for this or any previous visit (from the past 12 hour(s)). Radiologic Studies -   No orders to display     CT Results  (Last 48 hours)    None        CXR Results  (Last 48 hours)    None            Medical Decision Making   I am the first provider for this patient. I reviewed the vital signs, available nursing notes, past medical history, past surgical history, family history and social history. Vital Signs-Reviewed the patient's vital signs. No data found. Records Reviewed:   Nursing notes, Prior visits     Provider Notes (Medical Decision Making):   No acute abdomen on exam.  Patient looks well and nontoxic. Likely gastritis, but could be onset of inflammatory bowel disease. No concern for C. difficile, no concern for acute intra-abdominal process, doubt cholecystitis, appendicitis. Will check basic labs. Will give Pepcid, GI cocktail. Anticipate likely discharge to home. Will refer patient to GI. ED Course:   Initial assessment performed. The patients presenting problems have been discussed, and they are in agreement with the care plan formulated and outlined with them. I have encouraged them to ask questions as they arise throughout their visit. Critical Care Time:   none    Disposition:    DISCHARGE NOTE  Patients results have been reviewed with them. Patient and/or family have verbally conveyed their understanding and agreement of the patient's signs, symptoms, diagnosis, treatment and prognosis and additionally agree to follow up as recommended or return to the Emergency Room should their condition change or have any new concerns prior to their follow-up appointment. Patient verbally agrees with the care-plan and verbally conveys that all of their questions have been answered.    Discharge instructions have also been provided to the patient with some educational information regarding their diagnosis as well a list of reasons why they would want to return to the ER prior to their follow-up appointment should their condition change. PLAN:  1. Discharge Medication List as of 2/14/2022 11:37 PM      START taking these medications    Details   famotidine (Pepcid) 20 mg tablet Take 1 Tablet by mouth two (2) times a day for 10 days. , Normal, Disp-20 Tablet, R-0      dicyclomine (BENTYL) 10 mg capsule Take 1 Capsule by mouth three (3) times daily. , Normal, Disp-30 Capsule, R-0           2. Follow-up Information     Follow up With Specialties Details Why [address] [place] [address] EMERGENCY DEPT Emergency Medicine   [address] [facility] [address]    Bradley Price MD Internal Medicine, Gastroenterology Schedule an appointment as soon as possible for a visit   [address] [facility]  [address]  [address]  [phone]            Return to ED if worse     Diagnosis     Clinical Impression:   1. Acute gastritis without hemorrhage, unspecified gastritis type        Attestations:   This note was completed by Leonard Law DO

## 2022-02-15 NOTE — ED TRIAGE NOTES
Pt reports to ER w/ abdominal pain and loss of taste and smell x 2 weeks. Reports he was vomiting 1 week ago but has not since then. No known exposure to covid. Pt also reports he would like to be tested for STDs. No urinary pain or abnormal discharge noted.

## 2022-02-15 NOTE — DISCHARGE INSTRUCTIONS
Try using a scoop of Miralax (purchase over the counter at the pharmacy) every day to maintain normal bowel movements. Your examination and labs today are reassuring.

## 2022-02-15 NOTE — ED NOTES
See triage note      Emergency Department Nursing Plan of Care       The Nursing Plan of Care is developed from the Nursing assessment and Emergency Department Attending provider initial evaluation. The plan of care may be reviewed in the ED Provider note.     The Plan of Care was developed with the following considerations:   Patient / Family readiness to learn indicated by:verbalized understanding  Persons(s) to be included in education: patient  Barriers to Learning/Limitations:No    Signed     Stacie Beckman    2/14/2022   10:57 PM

## 2022-02-17 LAB
C TRACH RRNA SPEC QL NAA+PROBE: NEGATIVE
N GONORRHOEA RRNA SPEC QL NAA+PROBE: NEGATIVE
SPECIMEN SOURCE: NORMAL

## 2022-02-20 ENCOUNTER — HOSPITAL ENCOUNTER (EMERGENCY)
Age: 27
Discharge: LEFT BEFORE DISCHARGE | End: 2022-02-20
Attending: EMERGENCY MEDICINE
Payer: COMMERCIAL

## 2022-02-20 VITALS
OXYGEN SATURATION: 100 % | TEMPERATURE: 98.4 F | SYSTOLIC BLOOD PRESSURE: 127 MMHG | RESPIRATION RATE: 20 BRPM | HEART RATE: 94 BPM | DIASTOLIC BLOOD PRESSURE: 93 MMHG

## 2022-02-20 DIAGNOSIS — S61.219A LACERATION OF FINGER OF RIGHT HAND, FOREIGN BODY PRESENCE UNSPECIFIED, NAIL DAMAGE STATUS UNSPECIFIED, UNSPECIFIED FINGER, INITIAL ENCOUNTER: Primary | ICD-10-CM

## 2022-02-20 PROCEDURE — 99281 EMR DPT VST MAYX REQ PHY/QHP: CPT

## 2022-02-21 ENCOUNTER — HOSPITAL ENCOUNTER (EMERGENCY)
Age: 27
Discharge: HOME | End: 2022-02-21
Attending: EMERGENCY MEDICINE
Payer: COMMERCIAL

## 2022-02-21 ENCOUNTER — APPOINTMENT (OUTPATIENT)
Dept: GENERAL RADIOLOGY | Age: 27
End: 2022-02-21
Attending: EMERGENCY MEDICINE
Payer: COMMERCIAL

## 2022-02-21 VITALS
OXYGEN SATURATION: 96 % | TEMPERATURE: 98.1 F | DIASTOLIC BLOOD PRESSURE: 80 MMHG | HEART RATE: 76 BPM | RESPIRATION RATE: 16 BRPM | BODY MASS INDEX: 26.66 KG/M2 | HEIGHT: 65 IN | SYSTOLIC BLOOD PRESSURE: 118 MMHG | WEIGHT: 160 LBS

## 2022-02-21 DIAGNOSIS — S61.012A LACERATION OF LEFT THUMB WITHOUT FOREIGN BODY WITHOUT DAMAGE TO NAIL, INITIAL ENCOUNTER: Primary | ICD-10-CM

## 2022-02-21 PROCEDURE — 90715 TDAP VACCINE 7 YRS/> IM: CPT | Performed by: EMERGENCY MEDICINE

## 2022-02-21 PROCEDURE — 75810000293 HC SIMP/SUPERF WND  RPR

## 2022-02-21 PROCEDURE — 90471 IMMUNIZATION ADMIN: CPT

## 2022-02-21 PROCEDURE — 74011250636 HC RX REV CODE- 250/636: Performed by: EMERGENCY MEDICINE

## 2022-02-21 PROCEDURE — 73130 X-RAY EXAM OF HAND: CPT

## 2022-02-21 PROCEDURE — 99282 EMERGENCY DEPT VISIT SF MDM: CPT

## 2022-02-21 RX ADMIN — TETANUS TOXOID, REDUCED DIPHTHERIA TOXOID AND ACELLULAR PERTUSSIS VACCINE, ADSORBED 0.5 ML: 5; 2.5; 8; 8; 2.5 SUSPENSION INTRAMUSCULAR at 01:48

## 2022-02-21 NOTE — DISCHARGE INSTRUCTIONS
You were evaluated in the emergency department for left thumb laceration and pain. Your examination was reassuring as was your work-up including x-rays. Your laceration was repaired with 3 sutures which will need to be removed in about 7 days. If you develop worsening symptoms such as weakness or numbness of the thumb, redness, swelling, or pus draining from the stitches, please return to the emergency department immediately.

## 2022-02-21 NOTE — ED NOTES
Patient to room then exiting room. Continues to talk in word salad, Dr. Niranjan Concepcion saw patient in hallway. Patient stating \" I don't need nothing, I'm leaving\". Attempted to get patient to sign AMA form but he continues walking out of ED exit stating \" I'm leaving\". Out of ED with family member.
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
## 2022-03-19 PROBLEM — J18.9 PNEUMONIA: Status: ACTIVE | Noted: 2020-03-18

## 2022-06-26 PROCEDURE — 99283 EMERGENCY DEPT VISIT LOW MDM: CPT

## 2022-06-27 ENCOUNTER — HOSPITAL ENCOUNTER (EMERGENCY)
Age: 27
Discharge: HOME | End: 2022-06-27
Attending: EMERGENCY MEDICINE
Payer: COMMERCIAL

## 2022-06-27 VITALS
TEMPERATURE: 97.8 F | HEIGHT: 66 IN | WEIGHT: 140 LBS | RESPIRATION RATE: 14 BRPM | OXYGEN SATURATION: 99 % | SYSTOLIC BLOOD PRESSURE: 120 MMHG | DIASTOLIC BLOOD PRESSURE: 72 MMHG | HEART RATE: 87 BPM | BODY MASS INDEX: 22.5 KG/M2

## 2022-06-27 DIAGNOSIS — M54.50 ACUTE BILATERAL LOW BACK PAIN WITHOUT SCIATICA: Primary | ICD-10-CM

## 2022-06-27 DIAGNOSIS — F19.920 DRUG INTOXICATION WITHOUT COMPLICATION (HCC): ICD-10-CM

## 2022-06-27 PROCEDURE — 74011250637 HC RX REV CODE- 250/637: Performed by: EMERGENCY MEDICINE

## 2022-06-27 PROCEDURE — 74011000250 HC RX REV CODE- 250: Performed by: EMERGENCY MEDICINE

## 2022-06-27 RX ORDER — NAPROXEN 250 MG/1
500 TABLET ORAL ONCE
Status: COMPLETED | OUTPATIENT
Start: 2022-06-27 | End: 2022-06-27

## 2022-06-27 RX ORDER — NAPROXEN 500 MG/1
500 TABLET ORAL 2 TIMES DAILY WITH MEALS
Qty: 20 TABLET | Refills: 0 | Status: SHIPPED | OUTPATIENT
Start: 2022-06-27 | End: 2022-07-07

## 2022-06-27 RX ORDER — LIDOCAINE 4 G/100G
1 PATCH TOPICAL EVERY 24 HOURS
Status: DISCONTINUED | OUTPATIENT
Start: 2022-06-27 | End: 2022-06-27 | Stop reason: HOSPADM

## 2022-06-27 RX ADMIN — NAPROXEN 500 MG: 250 TABLET ORAL at 03:51
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
## 2022-06-27 NOTE — ED PROVIDER NOTES
Is a 25-year-old male who presents with low back pain. He reports that he did use drugs and alcohol today, but his symptoms are not related to his substance use. He is ambulating ED with a steady gait, appears sleepy but is oriented x4. Patient denies any inciting trauma. History reviewed. No pertinent past medical history. History reviewed. No pertinent surgical history. History reviewed. No pertinent family history. Social History     Socioeconomic History    Marital status: SINGLE     Spouse name: Not on file    Number of children: Not on file    Years of education: Not on file    Highest education level: Not on file   Occupational History    Not on file   Tobacco Use    Smoking status: Current Every Day Smoker    Smokeless tobacco: Never Used   Substance and Sexual Activity    Alcohol use: Yes     Comment: sometimes    Drug use: Yes     Types: Marijuana    Sexual activity: Not on file   Other Topics Concern    Not on file   Social History Narrative    Not on file     Social Determinants of Health     Financial Resource Strain:     Difficulty of Paying Living Expenses: Not on file   Food Insecurity:     Worried About Running Out of Food in the Last Year: Not on file    Belinda of Food in the Last Year: Not on file   Transportation Needs:     Lack of Transportation (Medical): Not on file    Lack of Transportation (Non-Medical):  Not on file   Physical Activity:     Days of Exercise per Week: Not on file    Minutes of Exercise per Session: Not on file   Stress:     Feeling of Stress : Not on file   Social Connections:     Frequency of Communication with Friends and Family: Not on file    Frequency of Social Gatherings with Friends and Family: Not on file    Attends Shinto Services: Not on file    Active Member of Clubs or Organizations: Not on file    Attends Club or Organization Meetings: Not on file    Marital Status: Not on file   Intimate Partner Violence:  Fear of Current or Ex-Partner: Not on file    Emotionally Abused: Not on file    Physically Abused: Not on file    Sexually Abused: Not on file   Housing Stability:     Unable to Pay for Housing in the Last Year: Not on file    Number of Places Lived in the Last Year: Not on file    Unstable Housing in the Last Year: Not on file         ALLERGIES: Patient has no known allergies. Review of Systems   Constitutional: Negative for chills and fever. HENT: Negative for drooling and nosebleeds. Eyes: Negative for pain and itching. Respiratory: Negative for choking and stridor. Cardiovascular: Negative for leg swelling. Gastrointestinal: Negative for abdominal pain and rectal pain. Endocrine: Negative for heat intolerance and polyphagia. Genitourinary: Negative for enuresis and genital sores. Musculoskeletal: Positive for back pain. Negative for arthralgias and joint swelling. Skin: Negative for color change. Allergic/Immunologic: Negative for immunocompromised state. Neurological: Negative for tremors and speech difficulty. Hematological: Negative for adenopathy. Psychiatric/Behavioral: Negative for dysphoric mood and sleep disturbance. Vitals:    06/26/22 2332 06/27/22 0328   BP: 125/75 120/72   Pulse: 62 87   Resp: 16 14   Temp: 97.9 °F (36.6 °C) 97.8 °F (36.6 °C)   SpO2: 98% 99%   Weight: 63.5 kg (140 lb)    Height: 5' 5.5\" (1.664 m)             Physical Exam  Vitals and nursing note reviewed. Constitutional:       General: He is not in acute distress. Appearance: He is well-developed. He is not ill-appearing, toxic-appearing or diaphoretic. HENT:      Head: Normocephalic. Nose: Nose normal.   Eyes:      Conjunctiva/sclera: Conjunctivae normal.   Cardiovascular:      Rate and Rhythm: Normal rate and regular rhythm. Heart sounds: Normal heart sounds. Pulmonary:      Effort: Pulmonary effort is normal. No respiratory distress.    Abdominal:      General: There is no distension. Palpations: Abdomen is soft. Comments: Back: no TTP along lower lumbar area. FROM not limited by pain. Musculoskeletal:         General: No deformity. Normal range of motion. Cervical back: Normal range of motion and neck supple. Skin:     General: Skin is warm and dry. Neurological:      Mental Status: He is alert. Coordination: Coordination normal.   Psychiatric:         Behavior: Behavior normal.          MDM  Number of Diagnoses or Management Options  Acute bilateral low back pain without sciatica  Drug intoxication without complication ([address]  Diagnosis management comments: Patient is sleepy, appears intoxicated but is clinically sober. Is answering my questions appropriately. Treated his back pain, full range of motion does not warrant imaging today. Ride was set up for him to get him back home. He is stable for discharge. Procedures    Patient's results have been reviewed with them. Patient and/or family have verbally conveyed their understanding and agreement of the patient's signs, symptoms, diagnosis, treatment and prognosis and additionally agree to follow up as recommended or return to the Emergency Room should their condition change prior to follow-up. Discharge instructions have also been provided to the patient with some educational information regarding their diagnosis as well a list of reasons why they would want to return to the ER prior to their follow-up appointment should their condition change.

## 2022-06-27 NOTE — ED NOTES
Took call to [address] [place] [address] Ave. and spoke with Cari Martinez, pt ID verified. Confirm transport pending.  Confirmation #[id number]

## 2022-07-03 ENCOUNTER — HOSPITAL ENCOUNTER (EMERGENCY)
Age: 27
Discharge: HOME | End: 2022-07-04
Attending: EMERGENCY MEDICINE | Admitting: EMERGENCY MEDICINE
Payer: COMMERCIAL

## 2022-07-03 VITALS
BODY MASS INDEX: 24.99 KG/M2 | TEMPERATURE: 98.1 F | WEIGHT: 150 LBS | DIASTOLIC BLOOD PRESSURE: 71 MMHG | HEIGHT: 65 IN | SYSTOLIC BLOOD PRESSURE: 122 MMHG | HEART RATE: 78 BPM | RESPIRATION RATE: 17 BRPM | OXYGEN SATURATION: 99 %

## 2022-07-03 DIAGNOSIS — L02.212 BACK ABSCESS: Primary | ICD-10-CM

## 2022-07-03 DIAGNOSIS — Z71.1 CONCERN ABOUT STD IN MALE WITHOUT DIAGNOSIS: ICD-10-CM

## 2022-07-03 DIAGNOSIS — L02.214 ABSCESS OF GROIN, RIGHT: ICD-10-CM

## 2022-07-03 LAB
APPEARANCE UR: CLEAR
BACTERIA URNS QL MICRO: NEGATIVE /HPF
BILIRUB UR QL: NEGATIVE
COLOR UR: ABNORMAL
EPITH CASTS URNS QL MICRO: ABNORMAL /LPF
GLUCOSE UR STRIP.AUTO-MCNC: NEGATIVE MG/DL
HGB UR QL STRIP: NEGATIVE
KETONES UR QL STRIP.AUTO: ABNORMAL MG/DL
LEUKOCYTE ESTERASE UR QL STRIP.AUTO: ABNORMAL
NITRITE UR QL STRIP.AUTO: NEGATIVE
PH UR STRIP: 7 [PH] (ref 5–8)
PROT UR STRIP-MCNC: ABNORMAL MG/DL
RBC #/AREA URNS HPF: ABNORMAL /HPF (ref 0–5)
SP GR UR REFRACTOMETRY: 1.01 (ref 1–1.03)
UA: UC IF INDICATED,UAUC: ABNORMAL
UROBILINOGEN UR QL STRIP.AUTO: 1 EU/DL (ref 0.2–1)
WBC URNS QL MICRO: ABNORMAL /HPF (ref 0–4)

## 2022-07-03 PROCEDURE — 81001 URINALYSIS AUTO W/SCOPE: CPT

## 2022-07-03 PROCEDURE — 75810000289 HC I&D ABSCESS SIMP/COMP/MULT

## 2022-07-03 PROCEDURE — 87491 CHLMYD TRACH DNA AMP PROBE: CPT

## 2022-07-03 PROCEDURE — 99283 EMERGENCY DEPT VISIT LOW MDM: CPT

## 2022-07-03 RX ORDER — CEPHALEXIN 500 MG/1
500 CAPSULE ORAL 4 TIMES DAILY
Qty: 28 CAPSULE | Refills: 0 | Status: SHIPPED | OUTPATIENT
Start: 2022-07-03 | End: 2022-07-10

## 2022-07-03 RX ORDER — IBUPROFEN 600 MG/1
600 TABLET ORAL
Qty: 20 TABLET | Refills: 0 | Status: SHIPPED | OUTPATIENT
Start: 2022-07-03

## 2022-07-03 RX ORDER — DOXYCYCLINE HYCLATE 100 MG
100 TABLET ORAL 2 TIMES DAILY
Qty: 20 TABLET | Refills: 0 | Status: SHIPPED | OUTPATIENT
Start: 2022-07-03 | End: 2022-07-13

## 2022-07-04 NOTE — ED NOTES
..Discharge summary and discharge medications reviewed with patient and appropriate educational materials and side effects teaching were provided. patient  Given 0 paper prescriptions and 3 electronic prescriptions sent to pt's listed pharmacy. Patient (s) verbalized understanding of the importance of discussing medications with his or her physician or clinic they will be following up with. No si/s of acute distress prior to discharge. Patient offered wheelchair from treatment area to hospital entrance, patient declined wheelchair.

## 2022-07-04 NOTE — ED PROVIDER NOTES
EMERGENCY DEPARTMENT HISTORY AND PHYSICAL EXAM    Date: 7/3/2022  Patient Name: Josephine Mckenzie    History of Presenting Illness     Chief Complaint   Patient presents with    Abscess     right mid back    Penile Discharge     Wants to be checked         History Provided By: Patient    Chief Complaint: abscess  Duration: onset 5 Days  Timing:  Acute  Location: right mid back and right groin  Quality: Sharp  Severity: 9 out of 10  Modifying Factors: palpation worsens pain  Associated Symptoms: denies any other associated signs or symptoms      HPI: Josephine Mckenzie is a 32 y.o. male with a PMH of No significant past medical history who presents with cyst to his right groin into his right side of his back days ago. Patient states he tried to squeeze the abscess on his back and did get out some pus. Patient also states that he wants to be checked for STDs but does not want treatment. States he just wants a checkup\". He denies penile discharge. PCP: None    Current Outpatient Medications   Medication Sig Dispense Refill    cephALEXin (Keflex) 500 mg capsule Take 1 Capsule by mouth four (4) times daily for 7 days. 28 Capsule 0    doxycycline (VIBRA-TABS) 100 mg tablet Take 1 Tablet by mouth two (2) times a day for 10 days. 20 Tablet 0    ibuprofen (MOTRIN) 600 mg tablet Take 1 Tablet by mouth every six (6) hours as needed for Pain. 20 Tablet 0    naproxen (Naprosyn) 500 mg tablet Take 1 Tablet by mouth two (2) times daily (with meals) for 10 days. 20 Tablet 0    dicyclomine (BENTYL) 10 mg capsule Take 1 Capsule by mouth three (3) times daily. 30 Capsule 0       Past History     Past Medical History:  No past medical history on file. Past Surgical History:  No past surgical history on file. Family History:  No family history on file.     Social History:  Social History     Tobacco Use    Smoking status: Current Every Day Smoker    Smokeless tobacco: Never Used   Vaping Use    Vaping Use: Never used Substance Use Topics    Alcohol use: Yes     Comment: sometimes    Drug use: Yes     Types: Marijuana       Allergies:  No Known Allergies      Review of Systems   Review of Systems   Constitutional: Negative for chills, fatigue and fever. HENT: Negative for congestion and sore throat. Eyes: Negative for redness. Respiratory: Negative for cough, chest tightness and wheezing. Cardiovascular: Negative for chest pain. Gastrointestinal: Negative for abdominal pain. Genitourinary: Negative for dysuria. Musculoskeletal: Negative for arthralgias, back pain, myalgias, neck pain and neck stiffness. Skin: Negative for rash. Back abscess groin abscess   Neurological: Negative for dizziness, syncope, weakness, light-headedness, numbness and headaches. Hematological: Negative for adenopathy. Psychiatric/Behavioral: Negative for agitation and behavioral problems. All other systems reviewed and are negative. Physical Exam     Vitals:    07/03/22 2307   BP: 122/71   Pulse: 78   Resp: 17   Temp: 98.1 °F (36.7 °C)   SpO2: 99%   Weight: 68 kg (150 lb)   Height: 5' 5\" (1.651 m)     Physical Exam  Vitals and nursing note reviewed. Constitutional:       Appearance: He is well-developed. HENT:      Head: Normocephalic and atraumatic. Right Ear: External ear normal.      Left Ear: External ear normal.      Nose: Nose normal.      Mouth/Throat:      Mouth: Mucous membranes are moist.   Eyes:      General:         Right eye: No discharge. Left eye: No discharge. Conjunctiva/sclera: Conjunctivae normal.   Cardiovascular:      Rate and Rhythm: Normal rate and regular rhythm. Heart sounds: Normal heart sounds. Pulmonary:      Effort: Pulmonary effort is normal. No respiratory distress. Breath sounds: Normal breath sounds. No wheezing. Abdominal:      General: Bowel sounds are normal.      Palpations: Abdomen is soft. Tenderness: There is no abdominal tenderness. Musculoskeletal:         General: Normal range of motion. Cervical back: Normal range of motion and neck supple. Lymphadenopathy:      Cervical: No cervical adenopathy. Skin:     General: Skin is warm and dry. Comments: 5 cm x 2 cm abscess right groin central pustule tender to palpation  Right mid back 3 cm fluctuant lesion central pustule   Neurological:      Mental Status: He is alert and oriented to person, place, and time. Cranial Nerves: No cranial nerve deficit. Psychiatric:         Behavior: Behavior normal.         Thought Content: Thought content normal.         Judgment: Judgment normal.           Diagnostic Study Results     Labs -     Recent Results (from the past 12 hour(s))   URINALYSIS W/ REFLEX CULTURE    Collection Time: 07/03/22 11:25 PM    Specimen: Urine   Result Value Ref Range    Color DARK YELLOW      Appearance CLEAR CLEAR      Specific gravity 1.015 1.003 - 1.030      pH (UA) 7.0 5.0 - 8.0      Protein TRACE (A) NEG mg/dL    Glucose Negative NEG mg/dL    Ketone TRACE (A) NEG mg/dL    Bilirubin Negative NEG      Blood Negative NEG      Urobilinogen 1.0 0.2 - 1.0 EU/dL    Nitrites Negative NEG      Leukocyte Esterase SMALL (A) NEG      WBC 0-4 0 - 4 /hpf    RBC 0-5 0 - 5 /hpf    Epithelial cells FEW FEW /lpf    Bacteria Negative NEG /hpf    UA:UC IF INDICATED CULTURE NOT INDICATED BY UA RESULT CNI         Radiologic Studies -   No orders to display     CT Results  (Last 48 hours)    None        CXR Results  (Last 48 hours)    None            Medical Decision Making   I am the first provider for this patient. I reviewed the vital signs, available nursing notes, past medical history, past surgical history, family history and social history. Vital Signs-Reviewed the patient's vital signs.     Records Reviewed: Nursing Notes    Provider Notes (Medical Decision Making):   DDX cellulitis folliculitis STD abscess          Disposition:  home    DISCHARGE NOTE: 12:59 AM  I have discussed with patient their diagnosis, treatment, and follow up plan. The patient agrees to follow up as outlined in discharge paperwork and also to return to the ED with any worsening. Zenaida Perry NP      Follow-up Information     Follow up With Specialties Details Why Contact Info    [facility] EMERGENCY DEPT Emergency Medicine In 2 days For wound re-check [address] [phone]          Discharge Medication List as of 7/4/2022 12:08 AM      START taking these medications    Details   cephALEXin (Keflex) 500 mg capsule Take 1 Capsule by mouth four (4) times daily for 7 days. , Normal, Disp-28 Capsule, R-0      doxycycline (VIBRA-TABS) 100 mg tablet Take 1 Tablet by mouth two (2) times a day for 10 days. , Normal, Disp-20 Tablet, R-0      ibuprofen (MOTRIN) 600 mg tablet Take 1 Tablet by mouth every six (6) hours as needed for Pain., Normal, Disp-20 Tablet, R-0         CONTINUE these medications which have NOT CHANGED    Details   naproxen (Naprosyn) 500 mg tablet Take 1 Tablet by mouth two (2) times daily (with meals) for 10 days. , Normal, Disp-20 Tablet, R-0      dicyclomine (BENTYL) 10 mg capsule Take 1 Capsule by mouth three (3) times daily. , Normal, Disp-30 Capsule, R-0             Procedures:  I&D Abcess Complex    Date/Time: 7/4/2022 11:45 PM  Performed by: Elsy Ferguson NP  Authorized by: Elsy Ferguson NP     Consent:     Consent obtained:  Verbal and written    Consent given by:  Patient    Risks discussed:  Bleeding and pain    Alternatives discussed: n/a. Universal protocol:     Procedure explained and questions answered to patient or proxy's satisfaction: yes      Relevant documents present and verified: yes      Test results available and properly labeled: n/a. Imaging studies available: n/a.       Required blood products, implants, devices, and special equipment available: yes      Site/side marked: yes      Immediately prior to procedure a time out was called: yes      Patient identity confirmed:  Verbally with patient  Location:     Type:  Abscess    Size:  3x5cm    Location: r groin. Pre-procedure details:     Skin preparation:  Betadine  Anesthesia (see MAR for exact dosages): Anesthesia method:  Local infiltration    Local anesthetic:  Lidocaine 2% WITH epi  Procedure type:     Complexity:  Complex  Procedure details:     Needle aspiration: no      Incision types:  Stab incision and single straight    Incision depth:  Subcutaneous    Scalpel blade:  11    Wound management:  Probed and deloculated and irrigated with saline    Drainage:  Purulent    Drainage amount:  Copious    Wound treatment:  Wound left open    Packing materials:  1/4 in gauze    Amount 1/4\":  3cm  Post-procedure details:     Patient tolerance of procedure: Tolerated well, no immediate complications  I&D Abcess Simple    Date/Time: 7/4/2022 11:23 PM  Performed by: Mara Carr NP  Authorized by: Mara Carr NP     Consent:     Consent obtained:  Verbal and written    Consent given by:  Parent    Risks discussed:  Bleeding    Alternatives discussed: n/a. Location:     Type:  Abscess    Size:  3 cm    Location: Back. Pre-procedure details:     Skin preparation:  Betadine  Anesthesia (see MAR for exact dosages): Anesthesia method:  Local infiltration    Local anesthetic:  Lidocaine 2% WITH epi  Procedure type:     Complexity:  Simple  Procedure details:     Needle aspiration: no      Incision types:  Stab incision and single straight    Scalpel blade:  11    Wound management:  Probed and deloculated and irrigated with saline    Drainage:  Serous    Drainage amount:  Scant    Wound treatment:  Wound left open    Packing materials:  None  Post-procedure details:     Patient tolerance of procedure: Tolerated well, no immediate complications        Please note that this dictation was completed with Dragon, computer voice recognition software.   Quite often unanticipated grammatical, syntax, homophones, and other interpretive errors are inadvertently transcribed by the computer software. Please disregard these errors. Additionally, please excuse any errors that have escaped final proofreading. Diagnosis     Clinical Impression:   1. Back abscess    2. Concern about STD in male without diagnosis    3.  Abscess of groin, right

## 2022-07-04 NOTE — ED TRIAGE NOTES
Pt here for a quarter-sized abscess to his mid right back.   It has been there 5 days and minimal drainage reported  Pt  Rates pain 9/10  AOx's 4

## 2023-05-19 RX ORDER — IBUPROFEN 600 MG/1
600 TABLET ORAL EVERY 6 HOURS PRN
COMMUNITY
Start: 2022-07-03

## 2023-05-19 RX ORDER — DICYCLOMINE HYDROCHLORIDE 10 MG/1
10 CAPSULE ORAL 3 TIMES DAILY
COMMUNITY
Start: 2022-02-14

## 2024-01-03 ENCOUNTER — HOSPITAL ENCOUNTER (EMERGENCY)
Facility: HOSPITAL | Age: 29
Discharge: HOME | End: 2024-01-03
Attending: STUDENT IN AN ORGANIZED HEALTH CARE EDUCATION/TRAINING PROGRAM
Payer: COMMERCIAL

## 2024-01-03 VITALS
OXYGEN SATURATION: 98 % | DIASTOLIC BLOOD PRESSURE: 88 MMHG | SYSTOLIC BLOOD PRESSURE: 128 MMHG | HEART RATE: 86 BPM | BODY MASS INDEX: 24.49 KG/M2 | TEMPERATURE: 98.5 F | WEIGHT: 147 LBS | RESPIRATION RATE: 16 BRPM | HEIGHT: 65 IN

## 2024-01-03 DIAGNOSIS — R31.9 HEMATURIA, UNSPECIFIED TYPE: ICD-10-CM

## 2024-01-03 DIAGNOSIS — N39.0 ACUTE UTI (URINARY TRACT INFECTION): Primary | ICD-10-CM

## 2024-01-03 LAB
APPEARANCE UR: ABNORMAL
BACTERIA URNS QL MICRO: NEGATIVE /HPF
BILIRUB UR QL: NEGATIVE
COLOR UR: ABNORMAL
EPITH CASTS URNS QL MICRO: ABNORMAL /LPF
GLUCOSE UR STRIP.AUTO-MCNC: NEGATIVE MG/DL
HGB UR QL STRIP: ABNORMAL
KETONES UR QL STRIP.AUTO: NEGATIVE MG/DL
LEUKOCYTE ESTERASE UR QL STRIP.AUTO: ABNORMAL
NITRITE UR QL STRIP.AUTO: NEGATIVE
PH UR STRIP: 7.5 (ref 5–8)
PROT UR STRIP-MCNC: NEGATIVE MG/DL
RBC #/AREA URNS HPF: ABNORMAL /HPF (ref 0–5)
SP GR UR REFRACTOMETRY: 1.01
URINE CULTURE IF INDICATED: ABNORMAL
UROBILINOGEN UR QL STRIP.AUTO: 1 EU/DL (ref 0.2–1)
WBC URNS QL MICRO: ABNORMAL /HPF (ref 0–4)

## 2024-01-03 PROCEDURE — 6360000002 HC RX W HCPCS: Performed by: STUDENT IN AN ORGANIZED HEALTH CARE EDUCATION/TRAINING PROGRAM

## 2024-01-03 PROCEDURE — 81001 URINALYSIS AUTO W/SCOPE: CPT

## 2024-01-03 PROCEDURE — 87086 URINE CULTURE/COLONY COUNT: CPT

## 2024-01-03 PROCEDURE — 87491 CHLMYD TRACH DNA AMP PROBE: CPT

## 2024-01-03 PROCEDURE — 87591 N.GONORRHOEAE DNA AMP PROB: CPT

## 2024-01-03 PROCEDURE — 99284 EMERGENCY DEPT VISIT MOD MDM: CPT

## 2024-01-03 PROCEDURE — 96372 THER/PROPH/DIAG INJ SC/IM: CPT

## 2024-01-03 PROCEDURE — 2580000003 HC RX 258: Performed by: STUDENT IN AN ORGANIZED HEALTH CARE EDUCATION/TRAINING PROGRAM

## 2024-01-03 PROCEDURE — 6370000000 HC RX 637 (ALT 250 FOR IP): Performed by: STUDENT IN AN ORGANIZED HEALTH CARE EDUCATION/TRAINING PROGRAM

## 2024-01-03 RX ORDER — AZITHROMYCIN 500 MG/1
1000 TABLET, FILM COATED ORAL
Status: COMPLETED | OUTPATIENT
Start: 2024-01-03 | End: 2024-01-03

## 2024-01-03 RX ORDER — CEFDINIR 300 MG/1
300 CAPSULE ORAL 2 TIMES DAILY
Qty: 14 CAPSULE | Refills: 0 | Status: SHIPPED | OUTPATIENT
Start: 2024-01-03 | End: 2024-01-10

## 2024-01-03 RX ADMIN — WATER 500 MG: 1 INJECTION INTRAMUSCULAR; INTRAVENOUS; SUBCUTANEOUS at 15:34

## 2024-01-03 RX ADMIN — AZITHROMYCIN 1000 MG: 500 TABLET, FILM COATED ORAL at 15:34

## 2024-01-03 NOTE — ED NOTES
Discharge instructions were given to the patient by Carla.     The patient left the Emergency Department ambulatory, alert and oriented and in no acute distress with 1 prescriptions. The patient was encouraged to call or return to the ED for worsening issues or problems and was encouraged to schedule a follow up appointment for continuing care.     The patient verbalized understanding of discharge instructions and prescriptions, all questions were answered. The patient has no further concerns at this time.

## 2024-01-03 NOTE — ED PROVIDER NOTES
[facility] EMERGENCY DEPT  EMERGENCY DEPARTMENT ENCOUNTER       Pt Name: Corby Hendricks  MRN: [id number]  Birthdate 1995  Date of evaluation: 1/3/2024  Provider: Jaspreet Saavedra MD   PCP: None, None  Note Started: 3:34 PM EST 1/3/24     CHIEF COMPLAINT       Chief Complaint   Patient presents with    Hematuria        HISTORY OF PRESENT ILLNESS: 1 or more elements      History From: Patient  HPI Limitations: None     Corby Hendricks is a 28 y.o. male who presents for hematuria that occurred yesterday.  He reports some slight discomfort while urinating.  Denies flank pain, back pain, abdominal pain, nausea, vomiting, diarrhea, fever, chills.  Does not take any prescription medications.  No new symptoms since yesterday.  Does not take any blood thinners.  Denies tobacco or alcohol use.  Patient is concerned he may have also contracted STI but is unsure when this would have happened.  He denies testicular pain or rashes.     Nursing Notes were all reviewed and agreed with or any disagreements were addressed in the HPI.     REVIEW OF SYSTEMS      Review of Systems     Positives and Pertinent negatives as per HPI.    PAST HISTORY     Past Medical History:  History reviewed. No pertinent past medical history.      Past Surgical History:  History reviewed. No pertinent surgical history.    Family History:  History reviewed. No pertinent family history.    Social History:  Social History     Tobacco Use    Smoking status: Every Day    Smokeless tobacco: Never   Substance Use Topics    Alcohol use: Yes     Comment: soc    Drug use: Yes     Types: Marijuana (Weed)       Allergies:  No Known Allergies    CURRENT MEDICATIONS      Discharge Medication List as of 1/3/2024  3:36 PM        CONTINUE these medications which have NOT CHANGED    Details   dicyclomine (BENTYL) 10 MG capsule Take 1 capsule by mouth 3 times dailyHistorical Med      ibuprofen (ADVIL;MOTRIN) 600 MG tablet Take 1 tablet by mouth every 6 hours as neededHistorical

## 2024-01-03 NOTE — ED NOTES
Pt is alert and oriented x 4, RR even and unlabored, skin is warm and dry. Assessment completed and pt updated on plan of care.  Call bell in reach.       Emergency Department Nursing Plan of Care       The Nursing Plan of Care is developed from the Nursing assessment and Emergency Department Attending provider initial evaluation.  The plan of care may be reviewed in the “ED Provider note”.    The Plan of Care was developed with the following considerations:   Patient / Family readiness to learn indicated by:verbalized understanding  Persons(s) to be included in education: patient  Barriers to Learning/Limitations:No    Signed

## 2024-01-04 LAB
BACTERIA SPEC CULT: NORMAL
C TRACH DNA SPEC QL NAA+PROBE: NEGATIVE
N GONORRHOEA DNA SPEC QL NAA+PROBE: POSITIVE
SAMPLE TYPE: ABNORMAL
SERVICE CMNT-IMP: ABNORMAL
SERVICE CMNT-IMP: NORMAL
SPECIMEN SOURCE: ABNORMAL